# Patient Record
Sex: MALE | Race: WHITE | Employment: OTHER | URBAN - NONMETROPOLITAN AREA
[De-identification: names, ages, dates, MRNs, and addresses within clinical notes are randomized per-mention and may not be internally consistent; named-entity substitution may affect disease eponyms.]

---

## 2018-05-21 ENCOUNTER — HOSPITAL ENCOUNTER (INPATIENT)
Age: 63
LOS: 3 days | Discharge: HOME | DRG: 246 | End: 2018-05-24
Attending: FAMILY MEDICINE | Admitting: INTERNAL MEDICINE
Payer: COMMERCIAL

## 2018-05-21 ENCOUNTER — APPOINTMENT (OUTPATIENT)
Dept: CT IMAGING | Age: 63
DRG: 246 | End: 2018-05-21
Payer: COMMERCIAL

## 2018-05-21 DIAGNOSIS — R07.89 OTHER CHEST PAIN: ICD-10-CM

## 2018-05-21 DIAGNOSIS — I21.3 ST ELEVATION MYOCARDIAL INFARCTION (STEMI), UNSPECIFIED ARTERY (HCC): Primary | ICD-10-CM

## 2018-05-21 LAB
ANION GAP SERPL CALCULATED.3IONS-SCNC: 11 MEQ/L (ref 8–16)
ANISOCYTOSIS: ABNORMAL
BASOPHILS # BLD: 0.1 %
BASOPHILS ABSOLUTE: 0 THOU/MM3 (ref 0–0.1)
BUN BLDV-MCNC: 14 MG/DL (ref 7–22)
CALCIUM SERPL-MCNC: 8.8 MG/DL (ref 8.5–10.5)
CHLORIDE BLD-SCNC: 101 MEQ/L (ref 98–111)
CO2: 27 MEQ/L (ref 23–33)
CREAT SERPL-MCNC: 1.2 MG/DL (ref 0.4–1.2)
EOSINOPHIL # BLD: 0.2 %
EOSINOPHILS ABSOLUTE: 0 THOU/MM3 (ref 0–0.4)
GFR SERPL CREATININE-BSD FRML MDRD: 61 ML/MIN/1.73M2
GLUCOSE BLD-MCNC: 125 MG/DL (ref 70–108)
HCT VFR BLD CALC: 45.8 % (ref 42–52)
HEMOGLOBIN: 15.9 GM/DL (ref 14–18)
LV EF: 53 %
LVEF MODALITY: NORMAL
LYMPHOCYTES # BLD: 7.1 %
LYMPHOCYTES ABSOLUTE: 1.2 THOU/MM3 (ref 1–4.8)
MAGNESIUM: 2 MG/DL (ref 1.6–2.4)
MCH RBC QN AUTO: 30.4 PG (ref 27–31)
MCHC RBC AUTO-ENTMCNC: 34.7 GM/DL (ref 33–37)
MCV RBC AUTO: 87.8 FL (ref 80–94)
MONOCYTES # BLD: 4.5 %
MONOCYTES ABSOLUTE: 0.8 THOU/MM3 (ref 0.4–1.3)
MRSA SCREEN RT-PCR: NEGATIVE
NUCLEATED RED BLOOD CELLS: 0 /100 WBC
PDW BLD-RTO: 14.5 % (ref 11.5–14.5)
PLATELET # BLD: 196 THOU/MM3 (ref 130–400)
PMV BLD AUTO: 10.1 FL (ref 7.4–10.4)
POTASSIUM SERPL-SCNC: 5 MEQ/L (ref 3.5–5.2)
RBC # BLD: 5.22 MILL/MM3 (ref 4.7–6.1)
SEG NEUTROPHILS: 88.1 %
SEGMENTED NEUTROPHILS ABSOLUTE COUNT: 14.9 THOU/MM3 (ref 1.8–7.7)
SODIUM BLD-SCNC: 139 MEQ/L (ref 135–145)
TROPONIN T: 4.09 NG/ML
WBC # BLD: 16.9 THOU/MM3 (ref 4.8–10.8)

## 2018-05-21 PROCEDURE — 84484 ASSAY OF TROPONIN QUANT: CPT

## 2018-05-21 PROCEDURE — 6370000000 HC RX 637 (ALT 250 FOR IP): Performed by: INTERNAL MEDICINE

## 2018-05-21 PROCEDURE — 99291 CRITICAL CARE FIRST HOUR: CPT | Performed by: INTERNAL MEDICINE

## 2018-05-21 PROCEDURE — 36415 COLL VENOUS BLD VENIPUNCTURE: CPT

## 2018-05-21 PROCEDURE — 2580000003 HC RX 258: Performed by: INTERNAL MEDICINE

## 2018-05-21 PROCEDURE — C1769 GUIDE WIRE: HCPCS

## 2018-05-21 PROCEDURE — C1757 CATH, THROMBECTOMY/EMBOLECT: HCPCS

## 2018-05-21 PROCEDURE — 99285 EMERGENCY DEPT VISIT HI MDM: CPT

## 2018-05-21 PROCEDURE — 80048 BASIC METABOLIC PNL TOTAL CA: CPT

## 2018-05-21 PROCEDURE — 70450 CT HEAD/BRAIN W/O DYE: CPT

## 2018-05-21 PROCEDURE — 85025 COMPLETE CBC W/AUTO DIFF WBC: CPT

## 2018-05-21 PROCEDURE — 93458 L HRT ARTERY/VENTRICLE ANGIO: CPT | Performed by: INTERNAL MEDICINE

## 2018-05-21 PROCEDURE — 92941 PRQ TRLML REVSC TOT OCCL AMI: CPT | Performed by: INTERNAL MEDICINE

## 2018-05-21 PROCEDURE — 87081 CULTURE SCREEN ONLY: CPT

## 2018-05-21 PROCEDURE — 93005 ELECTROCARDIOGRAM TRACING: CPT | Performed by: INTERNAL MEDICINE

## 2018-05-21 PROCEDURE — C1894 INTRO/SHEATH, NON-LASER: HCPCS

## 2018-05-21 PROCEDURE — B2151ZZ FLUOROSCOPY OF LEFT HEART USING LOW OSMOLAR CONTRAST: ICD-10-PCS | Performed by: INTERNAL MEDICINE

## 2018-05-21 PROCEDURE — 6360000002 HC RX W HCPCS

## 2018-05-21 PROCEDURE — 87641 MR-STAPH DNA AMP PROBE: CPT

## 2018-05-21 PROCEDURE — 6360000002 HC RX W HCPCS: Performed by: FAMILY MEDICINE

## 2018-05-21 PROCEDURE — 2100000000 HC CCU R&B

## 2018-05-21 PROCEDURE — B2111ZZ FLUOROSCOPY OF MULTIPLE CORONARY ARTERIES USING LOW OSMOLAR CONTRAST: ICD-10-PCS | Performed by: INTERNAL MEDICINE

## 2018-05-21 PROCEDURE — 6360000002 HC RX W HCPCS: Performed by: INTERNAL MEDICINE

## 2018-05-21 PROCEDURE — 2780000010 HC IMPLANT OTHER

## 2018-05-21 PROCEDURE — 93306 TTE W/DOPPLER COMPLETE: CPT

## 2018-05-21 PROCEDURE — 027135Z DILATION OF CORONARY ARTERY, TWO ARTERIES WITH TWO DRUG-ELUTING INTRALUMINAL DEVICES, PERCUTANEOUS APPROACH: ICD-10-PCS | Performed by: INTERNAL MEDICINE

## 2018-05-21 PROCEDURE — 83735 ASSAY OF MAGNESIUM: CPT

## 2018-05-21 PROCEDURE — 4A023N7 MEASUREMENT OF CARDIAC SAMPLING AND PRESSURE, LEFT HEART, PERCUTANEOUS APPROACH: ICD-10-PCS | Performed by: INTERNAL MEDICINE

## 2018-05-21 PROCEDURE — 96374 THER/PROPH/DIAG INJ IV PUSH: CPT

## 2018-05-21 PROCEDURE — C1874 STENT, COATED/COV W/DEL SYS: HCPCS

## 2018-05-21 PROCEDURE — C1887 CATHETER, GUIDING: HCPCS

## 2018-05-21 PROCEDURE — 2580000003 HC RX 258

## 2018-05-21 PROCEDURE — 2500000003 HC RX 250 WO HCPCS

## 2018-05-21 PROCEDURE — C1725 CATH, TRANSLUMIN NON-LASER: HCPCS

## 2018-05-21 PROCEDURE — 2720000010 HC SURG SUPPLY STERILE

## 2018-05-21 RX ORDER — SODIUM CHLORIDE 0.9 % (FLUSH) 0.9 %
10 SYRINGE (ML) INJECTION PRN
Status: DISCONTINUED | OUTPATIENT
Start: 2018-05-21 | End: 2018-05-24 | Stop reason: HOSPADM

## 2018-05-21 RX ORDER — ACETAMINOPHEN 325 MG/1
650 TABLET ORAL EVERY 4 HOURS PRN
Status: DISCONTINUED | OUTPATIENT
Start: 2018-05-21 | End: 2018-05-21 | Stop reason: SDUPTHER

## 2018-05-21 RX ORDER — ASPIRIN 81 MG/1
81 TABLET, CHEWABLE ORAL DAILY
Status: DISCONTINUED | OUTPATIENT
Start: 2018-05-22 | End: 2018-05-24 | Stop reason: HOSPADM

## 2018-05-21 RX ORDER — FINASTERIDE 5 MG/1
5 TABLET, FILM COATED ORAL DAILY
Status: ON HOLD | COMMUNITY
End: 2018-05-22 | Stop reason: CLARIF

## 2018-05-21 RX ORDER — ACETAMINOPHEN 325 MG/1
650 TABLET ORAL EVERY 4 HOURS PRN
Status: DISCONTINUED | OUTPATIENT
Start: 2018-05-21 | End: 2018-05-23 | Stop reason: SDUPTHER

## 2018-05-21 RX ORDER — CARVEDILOL 3.12 MG/1
3.12 TABLET ORAL 2 TIMES DAILY WITH MEALS
Status: DISCONTINUED | OUTPATIENT
Start: 2018-05-21 | End: 2018-05-24 | Stop reason: HOSPADM

## 2018-05-21 RX ORDER — LISINOPRIL 2.5 MG/1
2.5 TABLET ORAL DAILY
Status: DISCONTINUED | OUTPATIENT
Start: 2018-05-21 | End: 2018-05-24 | Stop reason: HOSPADM

## 2018-05-21 RX ORDER — ONDANSETRON 2 MG/ML
4 INJECTION INTRAMUSCULAR; INTRAVENOUS EVERY 6 HOURS PRN
Status: DISCONTINUED | OUTPATIENT
Start: 2018-05-21 | End: 2018-05-23 | Stop reason: SDUPTHER

## 2018-05-21 RX ORDER — ATROPINE SULFATE 0.1 MG/ML
0.5 INJECTION INTRAVENOUS ONCE
Status: COMPLETED | OUTPATIENT
Start: 2018-05-21 | End: 2018-05-21

## 2018-05-21 RX ORDER — SODIUM CHLORIDE 9 MG/ML
75 INJECTION, SOLUTION INTRAVENOUS CONTINUOUS
Status: DISCONTINUED | OUTPATIENT
Start: 2018-05-21 | End: 2018-05-22

## 2018-05-21 RX ORDER — SODIUM CHLORIDE 0.9 % (FLUSH) 0.9 %
10 SYRINGE (ML) INJECTION EVERY 12 HOURS SCHEDULED
Status: DISCONTINUED | OUTPATIENT
Start: 2018-05-21 | End: 2018-05-24 | Stop reason: HOSPADM

## 2018-05-21 RX ORDER — TAMSULOSIN HYDROCHLORIDE 0.4 MG/1
0.4 CAPSULE ORAL DAILY
COMMUNITY
End: 2021-04-06

## 2018-05-21 RX ORDER — TESTOSTERONE CYPIONATE 200 MG/ML
200 INJECTION INTRAMUSCULAR SEE ADMIN INSTRUCTIONS
Status: ON HOLD | COMMUNITY
End: 2018-05-24 | Stop reason: HOSPADM

## 2018-05-21 RX ORDER — ATORVASTATIN CALCIUM 80 MG/1
80 TABLET, FILM COATED ORAL NIGHTLY
Status: DISCONTINUED | OUTPATIENT
Start: 2018-05-21 | End: 2018-05-24 | Stop reason: HOSPADM

## 2018-05-21 RX ORDER — VALSARTAN 160 MG/1
160 TABLET ORAL DAILY
COMMUNITY
End: 2018-09-18 | Stop reason: ALTCHOICE

## 2018-05-21 RX ADMIN — TIROFIBAN 0.15 MCG/KG/MIN: 5 INJECTION, SOLUTION INTRAVENOUS at 20:19

## 2018-05-21 RX ADMIN — ACETAMINOPHEN 650 MG: 325 TABLET ORAL at 16:53

## 2018-05-21 RX ADMIN — CARVEDILOL 3.12 MG: 3.12 TABLET, FILM COATED ORAL at 18:24

## 2018-05-21 RX ADMIN — ATROPINE SULFATE 0.5 MG: 0.1 INJECTION, SOLUTION ENDOTRACHEAL; INTRAMUSCULAR; INTRAVENOUS; SUBCUTANEOUS at 13:14

## 2018-05-21 RX ADMIN — Medication 10 ML: at 20:24

## 2018-05-21 RX ADMIN — LISINOPRIL 2.5 MG: 2.5 TABLET ORAL at 18:25

## 2018-05-21 RX ADMIN — ATORVASTATIN CALCIUM 80 MG: 80 TABLET, FILM COATED ORAL at 20:24

## 2018-05-21 RX ADMIN — TICAGRELOR 90 MG: 90 TABLET ORAL at 20:24

## 2018-05-21 ASSESSMENT — ENCOUNTER SYMPTOMS
VOMITING: 0
EYE PAIN: 0
WHEEZING: 0
EYE REDNESS: 0
EYE DISCHARGE: 0
ABDOMINAL DISTENTION: 0
SHORTNESS OF BREATH: 0
CONSTIPATION: 0
RHINORRHEA: 0
PHOTOPHOBIA: 0
SINUS PRESSURE: 0
TROUBLE SWALLOWING: 0
EYE ITCHING: 0
BACK PAIN: 0
COUGH: 0
VOICE CHANGE: 0
CHOKING: 0
CHEST TIGHTNESS: 0
SORE THROAT: 0
DIARRHEA: 0
BLOOD IN STOOL: 0
NAUSEA: 0
ABDOMINAL PAIN: 0

## 2018-05-21 ASSESSMENT — PAIN SCALES - GENERAL
PAINLEVEL_OUTOF10: 4
PAINLEVEL_OUTOF10: 0

## 2018-05-22 ENCOUNTER — APPOINTMENT (OUTPATIENT)
Dept: MRI IMAGING | Age: 63
DRG: 246 | End: 2018-05-22
Payer: COMMERCIAL

## 2018-05-22 ENCOUNTER — APPOINTMENT (OUTPATIENT)
Dept: CT IMAGING | Age: 63
DRG: 246 | End: 2018-05-22
Payer: COMMERCIAL

## 2018-05-22 PROBLEM — E78.5 HYPERLIPIDEMIA: Status: ACTIVE | Noted: 2018-05-22

## 2018-05-22 PROBLEM — I63.9 LEFT PONTINE STROKE (HCC): Status: ACTIVE | Noted: 2018-05-22

## 2018-05-22 PROBLEM — H53.2 DIPLOPIA: Status: ACTIVE | Noted: 2018-05-22

## 2018-05-22 PROBLEM — I10 HYPERTENSION: Status: ACTIVE | Noted: 2018-05-22

## 2018-05-22 LAB
ABO: NORMAL
ALBUMIN SERPL-MCNC: 3.8 G/DL (ref 3.5–5.1)
ALP BLD-CCNC: 39 U/L (ref 38–126)
ALT SERPL-CCNC: 45 U/L (ref 11–66)
ANION GAP SERPL CALCULATED.3IONS-SCNC: 10 MEQ/L (ref 8–16)
ANION GAP SERPL CALCULATED.3IONS-SCNC: 11 MEQ/L (ref 8–16)
ANTIBODY SCREEN: NORMAL
AST SERPL-CCNC: 150 U/L (ref 5–40)
BILIRUB SERPL-MCNC: 0.4 MG/DL (ref 0.3–1.2)
BUN BLDV-MCNC: 13 MG/DL (ref 7–22)
BUN BLDV-MCNC: 14 MG/DL (ref 7–22)
CALCIUM SERPL-MCNC: 8.4 MG/DL (ref 8.5–10.5)
CALCIUM SERPL-MCNC: 8.6 MG/DL (ref 8.5–10.5)
CHLORIDE BLD-SCNC: 103 MEQ/L (ref 98–111)
CHLORIDE BLD-SCNC: 104 MEQ/L (ref 98–111)
CHOLESTEROL, TOTAL: 173 MG/DL (ref 100–199)
CO2: 23 MEQ/L (ref 23–33)
CO2: 25 MEQ/L (ref 23–33)
CREAT SERPL-MCNC: 0.9 MG/DL (ref 0.4–1.2)
CREAT SERPL-MCNC: 1 MG/DL (ref 0.4–1.2)
EKG ATRIAL RATE: 68 BPM
EKG ATRIAL RATE: 81 BPM
EKG ATRIAL RATE: 82 BPM
EKG P AXIS: 108 DEGREES
EKG P AXIS: 68 DEGREES
EKG P AXIS: 70 DEGREES
EKG P-R INTERVAL: 188 MS
EKG P-R INTERVAL: 188 MS
EKG P-R INTERVAL: 198 MS
EKG Q-T INTERVAL: 376 MS
EKG Q-T INTERVAL: 392 MS
EKG Q-T INTERVAL: 436 MS
EKG QRS DURATION: 106 MS
EKG QRS DURATION: 142 MS
EKG QRS DURATION: 96 MS
EKG QTC CALCULATION (BAZETT): 416 MS
EKG QTC CALCULATION (BAZETT): 439 MS
EKG QTC CALCULATION (BAZETT): 506 MS
EKG R AXIS: -173 DEGREES
EKG R AXIS: -94 DEGREES
EKG T AXIS: 144 DEGREES
EKG T AXIS: 36 DEGREES
EKG T AXIS: 72 DEGREES
EKG VENTRICULAR RATE: 68 BPM
EKG VENTRICULAR RATE: 81 BPM
EKG VENTRICULAR RATE: 82 BPM
GFR SERPL CREATININE-BSD FRML MDRD: 76 ML/MIN/1.73M2
GFR SERPL CREATININE-BSD FRML MDRD: 85 ML/MIN/1.73M2
GLUCOSE BLD-MCNC: 117 MG/DL (ref 70–108)
GLUCOSE BLD-MCNC: 128 MG/DL (ref 70–108)
HCT VFR BLD CALC: 40.9 % (ref 42–52)
HCT VFR BLD CALC: 41 % (ref 42–52)
HDLC SERPL-MCNC: 41 MG/DL
HEMOGLOBIN: 13.6 GM/DL (ref 14–18)
HEMOGLOBIN: 13.7 GM/DL (ref 14–18)
INR BLD: 1.01 (ref 0.85–1.13)
LDL CHOLESTEROL CALCULATED: 112 MG/DL
MAGNESIUM: 2 MG/DL (ref 1.6–2.4)
MCH RBC QN AUTO: 28.9 PG (ref 27–31)
MCH RBC QN AUTO: 29.4 PG (ref 27–31)
MCHC RBC AUTO-ENTMCNC: 33.3 GM/DL (ref 33–37)
MCHC RBC AUTO-ENTMCNC: 33.4 GM/DL (ref 33–37)
MCV RBC AUTO: 87 FL (ref 80–94)
MCV RBC AUTO: 87.8 FL (ref 80–94)
PDW BLD-RTO: 13.9 % (ref 11.5–14.5)
PDW BLD-RTO: 14.3 % (ref 11.5–14.5)
PLATELET # BLD: 148 THOU/MM3 (ref 130–400)
PLATELET # BLD: 164 THOU/MM3 (ref 130–400)
PMV BLD AUTO: 10 FL (ref 7.4–10.4)
PMV BLD AUTO: 10 FL (ref 7.4–10.4)
POTASSIUM REFLEX MAGNESIUM: 4.6 MEQ/L (ref 3.5–5.2)
POTASSIUM SERPL-SCNC: 4 MEQ/L (ref 3.5–5.2)
RBC # BLD: 4.66 MILL/MM3 (ref 4.7–6.1)
RBC # BLD: 4.72 MILL/MM3 (ref 4.7–6.1)
RH FACTOR: NORMAL
SODIUM BLD-SCNC: 137 MEQ/L (ref 135–145)
SODIUM BLD-SCNC: 139 MEQ/L (ref 135–145)
TOTAL PROTEIN: 6.4 G/DL (ref 6.1–8)
TRIGL SERPL-MCNC: 99 MG/DL (ref 0–199)
TROPONIN T: 4.64 NG/ML
WBC # BLD: 11.3 THOU/MM3 (ref 4.8–10.8)
WBC # BLD: 9.4 THOU/MM3 (ref 4.8–10.8)

## 2018-05-22 PROCEDURE — 84484 ASSAY OF TROPONIN QUANT: CPT

## 2018-05-22 PROCEDURE — 86901 BLOOD TYPING SEROLOGIC RH(D): CPT

## 2018-05-22 PROCEDURE — 85027 COMPLETE CBC AUTOMATED: CPT

## 2018-05-22 PROCEDURE — 93010 ELECTROCARDIOGRAM REPORT: CPT | Performed by: INTERNAL MEDICINE

## 2018-05-22 PROCEDURE — 80053 COMPREHEN METABOLIC PANEL: CPT

## 2018-05-22 PROCEDURE — 36415 COLL VENOUS BLD VENIPUNCTURE: CPT

## 2018-05-22 PROCEDURE — 2580000003 HC RX 258: Performed by: PSYCHIATRY & NEUROLOGY

## 2018-05-22 PROCEDURE — 80048 BASIC METABOLIC PNL TOTAL CA: CPT

## 2018-05-22 PROCEDURE — 6370000000 HC RX 637 (ALT 250 FOR IP): Performed by: PHYSICIAN ASSISTANT

## 2018-05-22 PROCEDURE — 70498 CT ANGIOGRAPHY NECK: CPT

## 2018-05-22 PROCEDURE — 80061 LIPID PANEL: CPT

## 2018-05-22 PROCEDURE — 93005 ELECTROCARDIOGRAM TRACING: CPT | Performed by: INTERNAL MEDICINE

## 2018-05-22 PROCEDURE — 99232 SBSQ HOSP IP/OBS MODERATE 35: CPT | Performed by: NURSE PRACTITIONER

## 2018-05-22 PROCEDURE — 85610 PROTHROMBIN TIME: CPT

## 2018-05-22 PROCEDURE — 70551 MRI BRAIN STEM W/O DYE: CPT

## 2018-05-22 PROCEDURE — 6360000004 HC RX CONTRAST MEDICATION: Performed by: PSYCHIATRY & NEUROLOGY

## 2018-05-22 PROCEDURE — 86850 RBC ANTIBODY SCREEN: CPT

## 2018-05-22 PROCEDURE — 2100000000 HC CCU R&B

## 2018-05-22 PROCEDURE — 6370000000 HC RX 637 (ALT 250 FOR IP): Performed by: INTERNAL MEDICINE

## 2018-05-22 PROCEDURE — 86900 BLOOD TYPING SEROLOGIC ABO: CPT

## 2018-05-22 PROCEDURE — 83735 ASSAY OF MAGNESIUM: CPT

## 2018-05-22 PROCEDURE — 2580000003 HC RX 258: Performed by: INTERNAL MEDICINE

## 2018-05-22 PROCEDURE — 99291 CRITICAL CARE FIRST HOUR: CPT | Performed by: PSYCHIATRY & NEUROLOGY

## 2018-05-22 PROCEDURE — 6360000002 HC RX W HCPCS: Performed by: PSYCHIATRY & NEUROLOGY

## 2018-05-22 PROCEDURE — 99223 1ST HOSP IP/OBS HIGH 75: CPT | Performed by: PSYCHIATRY & NEUROLOGY

## 2018-05-22 PROCEDURE — 70496 CT ANGIOGRAPHY HEAD: CPT

## 2018-05-22 PROCEDURE — 2700000000 HC OXYGEN THERAPY PER DAY

## 2018-05-22 RX ORDER — PANTOPRAZOLE SODIUM 40 MG/1
40 TABLET, DELAYED RELEASE ORAL ONCE
Status: COMPLETED | OUTPATIENT
Start: 2018-05-22 | End: 2018-05-22

## 2018-05-22 RX ORDER — OXYBUTYNIN CHLORIDE 5 MG/1
5 TABLET, EXTENDED RELEASE ORAL DAILY
COMMUNITY
End: 2018-09-13

## 2018-05-22 RX ORDER — TAMSULOSIN HYDROCHLORIDE 0.4 MG/1
0.4 CAPSULE ORAL DAILY
Status: DISCONTINUED | OUTPATIENT
Start: 2018-05-22 | End: 2018-05-24 | Stop reason: HOSPADM

## 2018-05-22 RX ORDER — SODIUM CHLORIDE 9 MG/ML
INJECTION, SOLUTION INTRAVENOUS CONTINUOUS
Status: DISCONTINUED | OUTPATIENT
Start: 2018-05-22 | End: 2018-05-23 | Stop reason: SDUPTHER

## 2018-05-22 RX ORDER — HEPARIN SODIUM 10000 [USP'U]/100ML
10 INJECTION, SOLUTION INTRAVENOUS CONTINUOUS
Status: DISCONTINUED | OUTPATIENT
Start: 2018-05-22 | End: 2018-05-24

## 2018-05-22 RX ORDER — SODIUM CHLORIDE 0.9 % (FLUSH) 0.9 %
10 SYRINGE (ML) INJECTION PRN
Status: DISCONTINUED | OUTPATIENT
Start: 2018-05-22 | End: 2018-05-24 | Stop reason: HOSPADM

## 2018-05-22 RX ORDER — CLOPIDOGREL BISULFATE 75 MG/1
75 TABLET ORAL DAILY
Status: DISCONTINUED | OUTPATIENT
Start: 2018-05-22 | End: 2018-05-24 | Stop reason: HOSPADM

## 2018-05-22 RX ORDER — OXYBUTYNIN CHLORIDE 5 MG/1
5 TABLET, EXTENDED RELEASE ORAL NIGHTLY
Status: DISCONTINUED | OUTPATIENT
Start: 2018-05-22 | End: 2018-05-24 | Stop reason: HOSPADM

## 2018-05-22 RX ADMIN — Medication 10 ML: at 22:58

## 2018-05-22 RX ADMIN — ACETAMINOPHEN 650 MG: 325 TABLET ORAL at 16:33

## 2018-05-22 RX ADMIN — MAGNESIUM HYDROXIDE 30 ML: 400 SUSPENSION ORAL at 22:45

## 2018-05-22 RX ADMIN — SODIUM CHLORIDE: 9 INJECTION, SOLUTION INTRAVENOUS at 22:55

## 2018-05-22 RX ADMIN — LISINOPRIL 2.5 MG: 2.5 TABLET ORAL at 08:05

## 2018-05-22 RX ADMIN — CARVEDILOL 3.12 MG: 3.12 TABLET, FILM COATED ORAL at 16:31

## 2018-05-22 RX ADMIN — TICAGRELOR 90 MG: 90 TABLET ORAL at 08:05

## 2018-05-22 RX ADMIN — IOPAMIDOL 80 ML: 755 INJECTION, SOLUTION INTRAVENOUS at 12:48

## 2018-05-22 RX ADMIN — PANTOPRAZOLE SODIUM 40 MG: 40 TABLET, DELAYED RELEASE ORAL at 13:28

## 2018-05-22 RX ADMIN — ATORVASTATIN CALCIUM 80 MG: 80 TABLET, FILM COATED ORAL at 22:59

## 2018-05-22 RX ADMIN — MAGNESIUM HYDROXIDE 30 ML: 400 SUSPENSION ORAL at 08:05

## 2018-05-22 RX ADMIN — ASPIRIN 81 MG 81 MG: 81 TABLET ORAL at 08:05

## 2018-05-22 RX ADMIN — Medication 10 ML: at 08:06

## 2018-05-22 RX ADMIN — CLOPIDOGREL BISULFATE 75 MG: 75 TABLET ORAL at 22:58

## 2018-05-22 RX ADMIN — OXYBUTYNIN CHLORIDE 5 MG: 5 TABLET, FILM COATED, EXTENDED RELEASE ORAL at 22:58

## 2018-05-22 RX ADMIN — CARVEDILOL 3.12 MG: 3.12 TABLET, FILM COATED ORAL at 08:05

## 2018-05-22 RX ADMIN — TAMSULOSIN HYDROCHLORIDE 0.4 MG: 0.4 CAPSULE ORAL at 22:58

## 2018-05-22 RX ADMIN — HEPARIN SODIUM 6.8 UNITS/KG/HR: 10000 INJECTION, SOLUTION INTRAVENOUS at 22:55

## 2018-05-22 ASSESSMENT — PAIN SCALES - GENERAL
PAINLEVEL_OUTOF10: 4
PAINLEVEL_OUTOF10: 4
PAINLEVEL_OUTOF10: 0

## 2018-05-22 ASSESSMENT — PAIN DESCRIPTION - LOCATION: LOCATION: HEAD

## 2018-05-22 ASSESSMENT — PAIN DESCRIPTION - DESCRIPTORS: DESCRIPTORS: ACHING

## 2018-05-22 ASSESSMENT — PAIN DESCRIPTION - PAIN TYPE: TYPE: ACUTE PAIN

## 2018-05-23 ENCOUNTER — APPOINTMENT (OUTPATIENT)
Dept: CARDIAC CATH/INVASIVE PROCEDURES | Age: 63
DRG: 246 | End: 2018-05-23
Payer: COMMERCIAL

## 2018-05-23 LAB
APTT: 44.6 SECONDS (ref 22–38)
APTT: 52.1 SECONDS (ref 22–38)
MRSA SCREEN: NORMAL
TROPONIN T: 1.91 NG/ML
VRE CULTURE: NORMAL

## 2018-05-23 PROCEDURE — 2500000003 HC RX 250 WO HCPCS

## 2018-05-23 PROCEDURE — 85730 THROMBOPLASTIN TIME PARTIAL: CPT

## 2018-05-23 PROCEDURE — C1884 EMBOLIZATION PROTECT SYST: HCPCS

## 2018-05-23 PROCEDURE — C1876 STENT, NON-COA/NON-COV W/DEL: HCPCS

## 2018-05-23 PROCEDURE — 6370000000 HC RX 637 (ALT 250 FOR IP): Performed by: PHYSICIAN ASSISTANT

## 2018-05-23 PROCEDURE — 2000000000 HC ICU R&B

## 2018-05-23 PROCEDURE — 6370000000 HC RX 637 (ALT 250 FOR IP): Performed by: INTERNAL MEDICINE

## 2018-05-23 PROCEDURE — 2580000003 HC RX 258: Performed by: PSYCHIATRY & NEUROLOGY

## 2018-05-23 PROCEDURE — C1769 GUIDE WIRE: HCPCS

## 2018-05-23 PROCEDURE — C1725 CATH, TRANSLUMIN NON-LASER: HCPCS

## 2018-05-23 PROCEDURE — C1887 CATHETER, GUIDING: HCPCS

## 2018-05-23 PROCEDURE — C1894 INTRO/SHEATH, NON-LASER: HCPCS

## 2018-05-23 PROCEDURE — 037K34Z DILATION OF RIGHT INTERNAL CAROTID ARTERY WITH DRUG-ELUTING INTRALUMINAL DEVICE, PERCUTANEOUS APPROACH: ICD-10-PCS | Performed by: PSYCHIATRY & NEUROLOGY

## 2018-05-23 PROCEDURE — 6360000002 HC RX W HCPCS

## 2018-05-23 PROCEDURE — 99232 SBSQ HOSP IP/OBS MODERATE 35: CPT | Performed by: NURSE PRACTITIONER

## 2018-05-23 PROCEDURE — 36415 COLL VENOUS BLD VENIPUNCTURE: CPT

## 2018-05-23 PROCEDURE — 37215 TRANSCATH STENT CCA W/EPS: CPT

## 2018-05-23 PROCEDURE — 84484 ASSAY OF TROPONIN QUANT: CPT

## 2018-05-23 RX ORDER — ONDANSETRON 2 MG/ML
4 INJECTION INTRAMUSCULAR; INTRAVENOUS EVERY 8 HOURS PRN
Status: DISCONTINUED | OUTPATIENT
Start: 2018-05-23 | End: 2018-05-24 | Stop reason: HOSPADM

## 2018-05-23 RX ORDER — ACETAMINOPHEN 325 MG/1
650 TABLET ORAL EVERY 4 HOURS PRN
Status: DISCONTINUED | OUTPATIENT
Start: 2018-05-23 | End: 2018-05-24 | Stop reason: HOSPADM

## 2018-05-23 RX ORDER — SODIUM CHLORIDE 9 MG/ML
INJECTION, SOLUTION INTRAVENOUS CONTINUOUS
Status: DISCONTINUED | OUTPATIENT
Start: 2018-05-23 | End: 2018-05-24

## 2018-05-23 RX ORDER — HYDROCODONE BITARTRATE AND ACETAMINOPHEN 5; 325 MG/1; MG/1
1 TABLET ORAL EVERY 4 HOURS PRN
Status: DISCONTINUED | OUTPATIENT
Start: 2018-05-23 | End: 2018-05-24 | Stop reason: HOSPADM

## 2018-05-23 RX ADMIN — CARVEDILOL 3.12 MG: 3.12 TABLET, FILM COATED ORAL at 22:12

## 2018-05-23 RX ADMIN — LISINOPRIL 2.5 MG: 2.5 TABLET ORAL at 09:11

## 2018-05-23 RX ADMIN — TAMSULOSIN HYDROCHLORIDE 0.4 MG: 0.4 CAPSULE ORAL at 09:11

## 2018-05-23 RX ADMIN — CARVEDILOL 3.12 MG: 3.12 TABLET, FILM COATED ORAL at 08:21

## 2018-05-23 RX ADMIN — OXYBUTYNIN CHLORIDE 5 MG: 5 TABLET, FILM COATED, EXTENDED RELEASE ORAL at 22:12

## 2018-05-23 RX ADMIN — ASPIRIN 81 MG 81 MG: 81 TABLET ORAL at 08:21

## 2018-05-23 RX ADMIN — SODIUM CHLORIDE: 9 INJECTION, SOLUTION INTRAVENOUS at 22:38

## 2018-05-23 RX ADMIN — SODIUM CHLORIDE: 9 INJECTION, SOLUTION INTRAVENOUS at 14:19

## 2018-05-23 RX ADMIN — ATORVASTATIN CALCIUM 80 MG: 80 TABLET, FILM COATED ORAL at 22:12

## 2018-05-23 RX ADMIN — SODIUM CHLORIDE: 9 INJECTION, SOLUTION INTRAVENOUS at 07:23

## 2018-05-23 RX ADMIN — CLOPIDOGREL BISULFATE 75 MG: 75 TABLET ORAL at 08:21

## 2018-05-23 ASSESSMENT — PAIN SCALES - GENERAL
PAINLEVEL_OUTOF10: 0

## 2018-05-24 VITALS
BODY MASS INDEX: 50.62 KG/M2 | OXYGEN SATURATION: 97 % | SYSTOLIC BLOOD PRESSURE: 133 MMHG | WEIGHT: 315 LBS | DIASTOLIC BLOOD PRESSURE: 59 MMHG | RESPIRATION RATE: 19 BRPM | HEIGHT: 66 IN | TEMPERATURE: 97.6 F | HEART RATE: 53 BPM

## 2018-05-24 PROCEDURE — 6360000002 HC RX W HCPCS: Performed by: INTERNAL MEDICINE

## 2018-05-24 PROCEDURE — 6370000000 HC RX 637 (ALT 250 FOR IP): Performed by: PHYSICIAN ASSISTANT

## 2018-05-24 PROCEDURE — 99239 HOSP IP/OBS DSCHRG MGMT >30: CPT | Performed by: PHYSICIAN ASSISTANT

## 2018-05-24 PROCEDURE — 6370000000 HC RX 637 (ALT 250 FOR IP): Performed by: INTERNAL MEDICINE

## 2018-05-24 RX ORDER — ASPIRIN 81 MG/1
81 TABLET, CHEWABLE ORAL DAILY
Qty: 30 TABLET | Refills: 3 | Status: SHIPPED | OUTPATIENT
Start: 2018-05-25

## 2018-05-24 RX ORDER — ATORVASTATIN CALCIUM 80 MG/1
80 TABLET, FILM COATED ORAL NIGHTLY
Qty: 30 TABLET | Refills: 3 | Status: SHIPPED | OUTPATIENT
Start: 2018-05-24 | End: 2018-06-28

## 2018-05-24 RX ORDER — CLOPIDOGREL BISULFATE 75 MG/1
75 TABLET ORAL DAILY
Qty: 30 TABLET | Refills: 3 | Status: SHIPPED | OUTPATIENT
Start: 2018-05-25 | End: 2018-09-18 | Stop reason: SDUPTHER

## 2018-05-24 RX ORDER — NITROGLYCERIN 0.4 MG/1
0.4 TABLET SUBLINGUAL EVERY 5 MIN PRN
Qty: 25 TABLET | Refills: 3 | Status: SHIPPED | OUTPATIENT
Start: 2018-05-24 | End: 2020-09-28 | Stop reason: SDUPTHER

## 2018-05-24 RX ORDER — CARVEDILOL 3.12 MG/1
3.12 TABLET ORAL 2 TIMES DAILY WITH MEALS
Qty: 60 TABLET | Refills: 3 | Status: SHIPPED | OUTPATIENT
Start: 2018-05-24 | End: 2018-06-13 | Stop reason: ALTCHOICE

## 2018-05-24 RX ADMIN — TAMSULOSIN HYDROCHLORIDE 0.4 MG: 0.4 CAPSULE ORAL at 08:08

## 2018-05-24 RX ADMIN — LISINOPRIL 2.5 MG: 2.5 TABLET ORAL at 08:08

## 2018-05-24 RX ADMIN — ENOXAPARIN SODIUM 40 MG: 40 INJECTION SUBCUTANEOUS at 12:47

## 2018-05-24 RX ADMIN — CLOPIDOGREL BISULFATE 75 MG: 75 TABLET ORAL at 08:10

## 2018-05-24 RX ADMIN — ASPIRIN 81 MG 81 MG: 81 TABLET ORAL at 08:10

## 2018-05-24 ASSESSMENT — PAIN SCALES - GENERAL
PAINLEVEL_OUTOF10: 0
PAINLEVEL_OUTOF10: 0

## 2018-05-29 ENCOUNTER — TELEPHONE (OUTPATIENT)
Dept: CARDIOLOGY CLINIC | Age: 63
End: 2018-05-29

## 2018-05-31 ENCOUNTER — TELEPHONE (OUTPATIENT)
Dept: CARDIOLOGY CLINIC | Age: 63
End: 2018-05-31

## 2018-06-13 ENCOUNTER — OFFICE VISIT (OUTPATIENT)
Dept: CARDIOLOGY CLINIC | Age: 63
End: 2018-06-13
Payer: COMMERCIAL

## 2018-06-13 VITALS
HEART RATE: 89 BPM | DIASTOLIC BLOOD PRESSURE: 77 MMHG | WEIGHT: 300 LBS | HEIGHT: 66 IN | SYSTOLIC BLOOD PRESSURE: 133 MMHG | BODY MASS INDEX: 48.21 KG/M2

## 2018-06-13 DIAGNOSIS — Z95.820 S/P ANGIOPLASTY WITH STENT: ICD-10-CM

## 2018-06-13 DIAGNOSIS — E78.00 PURE HYPERCHOLESTEROLEMIA: ICD-10-CM

## 2018-06-13 DIAGNOSIS — I21.11 ST ELEVATION MYOCARDIAL INFARCTION INVOLVING RIGHT CORONARY ARTERY (HCC): ICD-10-CM

## 2018-06-13 DIAGNOSIS — I10 ESSENTIAL HYPERTENSION: ICD-10-CM

## 2018-06-13 DIAGNOSIS — I25.10 CORONARY ARTERY DISEASE INVOLVING NATIVE CORONARY ARTERY OF NATIVE HEART WITHOUT ANGINA PECTORIS: Primary | ICD-10-CM

## 2018-06-13 PROCEDURE — 99213 OFFICE O/P EST LOW 20 MIN: CPT | Performed by: PHYSICIAN ASSISTANT

## 2018-06-28 ENCOUNTER — OFFICE VISIT (OUTPATIENT)
Dept: NEUROLOGY | Age: 63
End: 2018-06-28

## 2018-06-28 VITALS
HEART RATE: 68 BPM | DIASTOLIC BLOOD PRESSURE: 68 MMHG | WEIGHT: 300.4 LBS | SYSTOLIC BLOOD PRESSURE: 118 MMHG | BODY MASS INDEX: 48.28 KG/M2 | HEIGHT: 66 IN

## 2018-06-28 DIAGNOSIS — I99.8 ISCHEMIA: Primary | ICD-10-CM

## 2018-06-28 PROCEDURE — 99024 POSTOP FOLLOW-UP VISIT: CPT | Performed by: PSYCHIATRY & NEUROLOGY

## 2018-07-20 RX ORDER — VITAMIN B COMPLEX
TABLET ORAL DAILY
COMMUNITY
End: 2018-09-18 | Stop reason: ALTCHOICE

## 2018-07-20 NOTE — TELEPHONE ENCOUNTER
Pt wife LM on nurse line , pt was told at last appt to call and let office know how he was feeling after stopping Lipitor, is feeling fine. Asking if Estrella Luevano would want pt to try something new in place of lipitor. Jero advise?

## 2018-07-20 NOTE — TELEPHONE ENCOUNTER
Spoke to Yulia Feldman pt wife per jose, voiced understanding. Rx pended for Sin Neri. Med list updated.

## 2018-07-21 RX ORDER — PRAVASTATIN SODIUM 40 MG
40 TABLET ORAL NIGHTLY
Qty: 30 TABLET | Refills: 3 | Status: SHIPPED | OUTPATIENT
Start: 2018-07-21 | End: 2018-08-09

## 2018-08-09 ENCOUNTER — OFFICE VISIT (OUTPATIENT)
Dept: NEUROLOGY | Age: 63
End: 2018-08-09

## 2018-08-09 VITALS
SYSTOLIC BLOOD PRESSURE: 120 MMHG | DIASTOLIC BLOOD PRESSURE: 78 MMHG | BODY MASS INDEX: 49.18 KG/M2 | HEIGHT: 66 IN | HEART RATE: 80 BPM | WEIGHT: 306 LBS

## 2018-08-09 DIAGNOSIS — I99.8 ISCHEMIA: Primary | ICD-10-CM

## 2018-08-09 PROCEDURE — 99024 POSTOP FOLLOW-UP VISIT: CPT | Performed by: PSYCHIATRY & NEUROLOGY

## 2018-09-13 ENCOUNTER — OFFICE VISIT (OUTPATIENT)
Dept: NEUROLOGY | Age: 63
End: 2018-09-13
Payer: COMMERCIAL

## 2018-09-13 VITALS
SYSTOLIC BLOOD PRESSURE: 132 MMHG | HEART RATE: 78 BPM | WEIGHT: 309 LBS | HEIGHT: 66 IN | BODY MASS INDEX: 49.66 KG/M2 | DIASTOLIC BLOOD PRESSURE: 82 MMHG

## 2018-09-13 DIAGNOSIS — I99.8 ISCHEMIA: Primary | ICD-10-CM

## 2018-09-13 PROCEDURE — 99212 OFFICE O/P EST SF 10 MIN: CPT | Performed by: PSYCHIATRY & NEUROLOGY

## 2018-09-16 NOTE — PROGRESS NOTES
[address] [facility]  [address] [facility] [address].  Dept: [phone]  Dept Fax: [phone]: [phone]          Dear Dr. Eamon Weber, I had the pleasure of seeing Maribel Calderon in clinic on 9/13/2018. Below is my most recent note:       ASSESSMENT AND PLAN:  Recent stroke s/p stenting. He was having attention issues on last visit that are improving. At this point, no changes to medications/regimen. Carotid duplex in 6 months. This can be ordered by PCP. Patient given educational materials - see patient instructions. Discussed use, benefit, and side effects of prescribed medications. Personally reviewed imaging with patients and all questions answered. Pt voiced understanding. Patient agreed with treatment plan. 10 minutes of face-to-face time was spent on the care of this patient, greater than half of this time involved counseling and coordination of care regarding diagnosis, risk and benefits of various treatment plans and expected outcomes. Please feel free to call with any questions. Yolanda Estrada M.D., J.D. Vascular Neurology and Endovascular Surgical Neuroradiology  Cell Number: [id number]        Subjective/History of Presenting Illness:  Maribel Calderon is a 59 y.o. male being seen for follow-up regarding prior stroke and stenting and attention issues since stroke. Since being seen last, Maribel Calderon has done better. He notes his attention is improving. 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 Review of Systems:  ROS was reviewed today and unchanged since last review. Namely, there are no new rashes, no new symptoms concerning for anaphylaxis, no new black/tarry stools, no new symptoms of temperature intolerance. Medications:  No current facility-administered medications for this visit.      Objective:  Vitals:    09/13/18 1027   BP: 132/82   Pulse: 78

## 2018-09-18 ENCOUNTER — OFFICE VISIT (OUTPATIENT)
Dept: CARDIOLOGY CLINIC | Age: 63
End: 2018-09-18
Payer: COMMERCIAL

## 2018-09-18 VITALS
HEIGHT: 66 IN | HEART RATE: 100 BPM | SYSTOLIC BLOOD PRESSURE: 142 MMHG | WEIGHT: 308.8 LBS | BODY MASS INDEX: 49.63 KG/M2 | DIASTOLIC BLOOD PRESSURE: 84 MMHG

## 2018-09-18 DIAGNOSIS — E78.00 PURE HYPERCHOLESTEROLEMIA: ICD-10-CM

## 2018-09-18 DIAGNOSIS — I10 ESSENTIAL HYPERTENSION: Primary | ICD-10-CM

## 2018-09-18 DIAGNOSIS — I25.10 CORONARY ARTERY DISEASE INVOLVING NATIVE CORONARY ARTERY OF NATIVE HEART WITHOUT ANGINA PECTORIS: ICD-10-CM

## 2018-09-18 PROCEDURE — 99214 OFFICE O/P EST MOD 30 MIN: CPT | Performed by: NUCLEAR MEDICINE

## 2018-09-18 RX ORDER — IRBESARTAN 150 MG/1
150 TABLET ORAL DAILY
COMMUNITY
End: 2018-09-18 | Stop reason: SDUPTHER

## 2018-09-18 RX ORDER — CLOPIDOGREL BISULFATE 75 MG/1
75 TABLET ORAL DAILY
Qty: 90 TABLET | Refills: 3 | Status: SHIPPED | OUTPATIENT
Start: 2018-09-18 | End: 2019-09-23 | Stop reason: SDUPTHER

## 2018-09-18 RX ORDER — DIMENHYDRINATE 50 MG
TABLET ORAL
COMMUNITY
End: 2021-06-15

## 2018-09-18 RX ORDER — IRBESARTAN 150 MG/1
150 TABLET ORAL DAILY
Qty: 90 TABLET | Refills: 3 | Status: SHIPPED | OUTPATIENT
Start: 2018-09-18 | End: 2019-09-23 | Stop reason: SDUPTHER

## 2018-09-18 RX ORDER — PRAVASTATIN SODIUM 40 MG
20 TABLET ORAL EVERY OTHER DAY
COMMUNITY
End: 2021-04-06

## 2018-10-03 NOTE — PROGRESS NOTES
Neuro: A+Ox3  CN 2-12 intact   Antigravity x4  Sensory/Reflexes unchanged    Labs and imaging have been personally reviewed.   Lab Results   Component Value Date    WBC 9.4 05/22/2018    HGB 13.7 05/22/2018    HCT 40.9 05/22/2018    MCV 87.8 05/22/2018     05/22/2018     Lab Results   Component Value Date     05/22/2018    K 4.0 05/22/2018    K 4.6 05/22/2018     05/22/2018    CO2 23 05/22/2018    BUN 13 05/22/2018    CREATININE 0.9 05/22/2018    CALCIUM 8.6 05/22/2018     Lab Results   Component Value Date    INR 1.01 05/22/2018     Lab Results   Component Value Date    APTT 52.1 05/23/2018     Lab Results   Component Value Date    ALKPHOS 39 05/22/2018    ALT 45 05/22/2018     05/22/2018    BILITOT 0.4 05/22/2018    LABALBU 3.8 05/22/2018     No results found for: TROPONINI   No results found for: LABA1C    No results found for: CHARCSF, CULTURE  No results found for: PHENYTOIN, CARBTOT, PHENOBARB, VALPROATE  No results found for: TSHHS    No results found for: RONAL Jurado, LABCAST, [facility], [address] [place] [address], MUCUS, TRICHOMONAS, YEAST, BACTERIA, CLARITYU, SPECGRAV, LEUKOCYTESUR, [address], [address] [place], [place] [address] [address] GLUCOSEU, [address], AMORPHOUS

## 2018-10-30 ENCOUNTER — TELEPHONE (OUTPATIENT)
Dept: CARDIOLOGY CLINIC | Age: 63
End: 2018-10-30

## 2018-11-14 RX ORDER — ICOSAPENT ETHYL 1000 MG/1
1 CAPSULE ORAL DAILY
Qty: 30 CAPSULE | Refills: 11 | Status: SHIPPED | OUTPATIENT
Start: 2018-11-14 | End: 2021-04-06

## 2018-11-14 RX ORDER — ICOSAPENT ETHYL 1000 MG/1
1 CAPSULE ORAL DAILY
COMMUNITY
End: 2018-11-14 | Stop reason: SDUPTHER

## 2018-11-14 NOTE — TELEPHONE ENCOUNTER
Patient states he is now doing Pravastatin 20 mg every other day. Joint pain seem's to be better. Patient asking Vascepa fish oil is an option for his cholesterol? He heard about Vascepa on 60 minutes.

## 2019-03-05 ENCOUNTER — OFFICE VISIT (OUTPATIENT)
Dept: CARDIOLOGY CLINIC | Age: 64
End: 2019-03-05
Payer: COMMERCIAL

## 2019-03-05 VITALS
HEART RATE: 88 BPM | DIASTOLIC BLOOD PRESSURE: 96 MMHG | WEIGHT: 315 LBS | BODY MASS INDEX: 53.48 KG/M2 | SYSTOLIC BLOOD PRESSURE: 160 MMHG

## 2019-03-05 DIAGNOSIS — E78.01 FAMILIAL HYPERCHOLESTEROLEMIA: ICD-10-CM

## 2019-03-05 DIAGNOSIS — R09.89 BRUIT: ICD-10-CM

## 2019-03-05 DIAGNOSIS — I10 ESSENTIAL HYPERTENSION: ICD-10-CM

## 2019-03-05 DIAGNOSIS — I25.10 CORONARY ARTERY DISEASE INVOLVING NATIVE CORONARY ARTERY OF NATIVE HEART WITHOUT ANGINA PECTORIS: Primary | ICD-10-CM

## 2019-03-05 PROCEDURE — 99214 OFFICE O/P EST MOD 30 MIN: CPT | Performed by: NUCLEAR MEDICINE

## 2019-03-12 DIAGNOSIS — I10 ESSENTIAL HYPERTENSION: ICD-10-CM

## 2019-03-12 DIAGNOSIS — E78.01 FAMILIAL HYPERCHOLESTEROLEMIA: ICD-10-CM

## 2019-03-12 DIAGNOSIS — I25.10 CORONARY ARTERY DISEASE INVOLVING NATIVE CORONARY ARTERY OF NATIVE HEART WITHOUT ANGINA PECTORIS: ICD-10-CM

## 2019-03-12 DIAGNOSIS — R09.89 BRUIT: ICD-10-CM

## 2019-04-11 ENCOUNTER — TELEPHONE (OUTPATIENT)
Dept: CARDIOLOGY CLINIC | Age: 64
End: 2019-04-11

## 2019-09-23 RX ORDER — IRBESARTAN 150 MG/1
150 TABLET ORAL DAILY
Qty: 90 TABLET | Refills: 3 | Status: SHIPPED | OUTPATIENT
Start: 2019-09-23 | End: 2020-07-14 | Stop reason: DRUGHIGH

## 2019-09-23 RX ORDER — CLOPIDOGREL BISULFATE 75 MG/1
75 TABLET ORAL DAILY
Qty: 90 TABLET | Refills: 3 | Status: SHIPPED | OUTPATIENT
Start: 2019-09-23 | End: 2020-09-28 | Stop reason: SDUPTHER

## 2019-11-05 ENCOUNTER — OFFICE VISIT (OUTPATIENT)
Dept: CARDIOLOGY CLINIC | Age: 64
End: 2019-11-05
Payer: COMMERCIAL

## 2019-11-05 VITALS
HEART RATE: 100 BPM | WEIGHT: 315 LBS | DIASTOLIC BLOOD PRESSURE: 98 MMHG | SYSTOLIC BLOOD PRESSURE: 164 MMHG | BODY MASS INDEX: 50.62 KG/M2 | HEIGHT: 66 IN

## 2019-11-05 DIAGNOSIS — I10 ESSENTIAL HYPERTENSION: ICD-10-CM

## 2019-11-05 DIAGNOSIS — E78.01 FAMILIAL HYPERCHOLESTEROLEMIA: ICD-10-CM

## 2019-11-05 DIAGNOSIS — I25.10 CORONARY ARTERY DISEASE INVOLVING NATIVE CORONARY ARTERY OF NATIVE HEART WITHOUT ANGINA PECTORIS: Primary | ICD-10-CM

## 2019-11-05 PROCEDURE — 99213 OFFICE O/P EST LOW 20 MIN: CPT | Performed by: NUCLEAR MEDICINE

## 2020-01-02 ENCOUNTER — TELEPHONE (OUTPATIENT)
Dept: CARDIOLOGY CLINIC | Age: 65
End: 2020-01-02

## 2020-01-02 NOTE — TELEPHONE ENCOUNTER
Pt calling. He states he is still having muscle aches/pain along with \"inactivity\" and would like to hold Repatha for a couple months to see if it goes away. Please advise.

## 2020-01-31 ENCOUNTER — TELEPHONE (OUTPATIENT)
Dept: CARDIOLOGY CLINIC | Age: 65
End: 2020-01-31

## 2020-07-14 ENCOUNTER — OFFICE VISIT (OUTPATIENT)
Dept: CARDIOLOGY CLINIC | Age: 65
End: 2020-07-14
Payer: COMMERCIAL

## 2020-07-14 VITALS
HEART RATE: 68 BPM | SYSTOLIC BLOOD PRESSURE: 158 MMHG | BODY MASS INDEX: 49.44 KG/M2 | HEIGHT: 67 IN | DIASTOLIC BLOOD PRESSURE: 100 MMHG | WEIGHT: 315 LBS

## 2020-07-14 PROCEDURE — 99214 OFFICE O/P EST MOD 30 MIN: CPT | Performed by: NUCLEAR MEDICINE

## 2020-07-14 RX ORDER — IRBESARTAN 300 MG/1
300 TABLET ORAL DAILY
COMMUNITY
End: 2020-07-14 | Stop reason: SDUPTHER

## 2020-07-14 RX ORDER — IRBESARTAN 300 MG/1
300 TABLET ORAL DAILY
Qty: 90 TABLET | Refills: 3 | Status: SHIPPED | OUTPATIENT
Start: 2020-07-14 | End: 2021-04-06 | Stop reason: SDUPTHER

## 2020-07-14 NOTE — PROGRESS NOTES
[address] [place] [address]  Dept: [phone]  Dept Fax: [phone]  Loc: [phone]    Visit Date: 2020    Sam Escoto is a 60 y.o. male who presents todayfor:  Chief Complaint   Patient presents with    Check-Up    Coronary Artery Disease    Hyperlipidemia    Hypertension   severe obesity   Does have uncontrolled HTN   No chest pain per se  Does have some dyspnea at baseline  Previous carotid stenting   On repatha   BP is higher but not that high at home  Going for prostate surgery   On no statins   No changes in breathing  Wants to discuss testosterone  Previous MI  No chest pain per se      HPI:  HPI  Past Medical History:   Diagnosis Date    Arthritis     Hyperlipidemia     Hypertension     Other disorders of kidney and ureter in diseases classified elsewhere     low testosterone- gets shots [id number]      Past Surgical History:   Procedure Laterality Date    COLONOSCOPY  2008    normal     Family History   Problem Relation Age of Onset    Cancer Mother     Thyroid Disease Mother     Cancer Father     Diabetes Father     Thyroid Disease Sister     Early Death Brother     Cancer Paternal Uncle     Alzheimer's Disease Maternal Grandmother     Early Death Maternal Grandfather     Heart Attack Maternal Grandfather     Diabetes Paternal Grandmother      Social History     Tobacco Use    Smoking status: Former Smoker     Types: Cigarettes     Last attempt to quit: 1980     Years since quittin.1    Smokeless tobacco: Never Used   Substance Use Topics    Alcohol use:  Yes     Alcohol/week: 24.0 standard drinks     Types: 24 Cans of beer per week      Current Outpatient Medications   Medication Sig Dispense Refill    Evolocumab (REPATHA SURECLICK) 148 MG/ML SOAJ Inject 140 mg into the skin every 14 days (Patient not taking: Reported on 2020) 2 pen 11    clopidogrel (PLAVIX) 75 MG tablet Take 1 tablet by mouth daily 90 tablet 3    metoprolol tartrate (LOPRESSOR) 25 MG tablet Take 1 tablet by mouth 2 times daily 180 tablet 3    irbesartan (AVAPRO) 150 MG tablet Take 1 tablet by mouth daily 90 tablet 3    Icosapent Ethyl (VASCEPA) 1 g CAPS capsule Take 1 capsule by mouth daily 30 capsule 11    pravastatin (PRAVACHOL) 40 MG tablet Take 20 mg by mouth every other day       Coenzyme Q10 (CO Q-10) 100 MG CAPS Take by mouth      aspirin 81 MG chewable tablet Take 1 tablet by mouth daily 30 tablet 3    nitroGLYCERIN (NITROSTAT) 0.4 MG SL tablet Place 1 tablet under the tongue every 5 minutes as needed for Chest pain up to max of 3 total doses. If no relief after 1 dose, call 911. 25 tablet 3    tamsulosin (FLOMAX) 0.4 MG capsule Take 0.4 mg by mouth daily       No current facility-administered medications for this visit.       Allergies   Allergen Reactions    Lipitor [Atorvastatin] Other (See Comments)     Hard time breathing       Crestor [Rosuvastatin Calcium]      Joint pains      Pravastatin      ache     Health Maintenance   Topic Date Due    Hepatitis C screen  1955    HIV screen  01/20/1971    DTaP/Tdap/Td vaccine (1 - Tdap) 01/20/1975    Diabetes screen  01/20/1996    Shingles Vaccine (1 of 2) 01/20/2006    Colon cancer screen colonoscopy  01/20/2006    Lipid screen  05/22/2019    Potassium monitoring  05/22/2019    Creatinine monitoring  05/22/2019    Flu vaccine (1) 09/01/2020    Hepatitis A vaccine  Aged Out    Hepatitis B vaccine  Aged Out    Hib vaccine  Aged Out    Meningococcal (ACWY) vaccine  Aged Out    Pneumococcal 0-64 years Vaccine  Aged Out       Subjective:  Review of Systems  General:   No fever, no chills, some fatigue or weight loss  Pulmonary:    some dyspnea, no wheezing  Cardiac:    Denies recent chest pain,   GI:     No nausea or vomiting, no abdominal pain  Neuro:     No dizziness or light headedness,   Musculoskeletal:  No recent active issues  Extremities:   No edema, no obvious claudication       Objective:  Physical Exam  BP (!) 186/102   Pulse 68   Ht 5' 7\" (1.702 m)   Wt (!) 330 lb 11 oz (150 kg)   BMI 51.79 kg/m²   General:   Well developed, well nourished  Lungs:    Clear to auscultation  Heart:    Normal S1 S2, Slight murmur. no rubs, no gallops  Abdomen:   Soft, non tender, no organomegalies, positive bowel sounds  Extremities:   No edema, no cyanosis, good peripheral pulses  Neurological:   Awake, alert, oriented. No obvious focal deficits  Musculoskelatal:  No obvious deformities    Assessment:      Diagnosis Orders   1. Familial hypercholesterolemia     2. Essential hypertension     3. PVD (peripheral vascular disease) ([address]     4. Coronary artery disease involving native coronary artery of native heart without angina pectoris     higher risk for CAD  Discussed testosterone risk at length   BP needs addressed  Likely sleep apnea  Discussed at length     Plan:  No follow-ups on file. Change avapro 300 daily   Continue risk factor modification and medical management  Thank you for allowing me to participate in the care of your patient. Please don't hesitate to contact me regarding any further issues related to the patient care    Orders Placed:  No orders of the defined types were placed in this encounter. Medications Prescribed:  No orders of the defined types were placed in this encounter. Discussed use, benefit, and side effects of prescribed medications. All patient questions answered. Pt voicedunderstanding. Instructed to continue current medications, diet and exercise. Continue risk factor modification and medical management. Patient agreed with treatment plan. Follow up as directed.     Electronically signedby Obi Rizvi MD on 7/14/2020 at 1:17 PM

## 2020-07-14 NOTE — PROGRESS NOTES
Patient here for check up. Patient didn't bring medication list or bottles today. Patient states he doesn't know what he takes. Patient will call office with updated medications.

## 2020-09-28 RX ORDER — CLOPIDOGREL BISULFATE 75 MG/1
75 TABLET ORAL DAILY
Qty: 90 TABLET | Refills: 2 | Status: SHIPPED | OUTPATIENT
Start: 2020-09-28 | End: 2021-04-06 | Stop reason: SDUPTHER

## 2020-09-28 RX ORDER — NITROGLYCERIN 0.4 MG/1
0.4 TABLET SUBLINGUAL EVERY 5 MIN PRN
Qty: 25 TABLET | Refills: 3 | Status: SHIPPED | OUTPATIENT
Start: 2020-09-28

## 2020-09-28 NOTE — TELEPHONE ENCOUNTER
Eliel Lackey called requesting a refill on the following medications:  Requested Prescriptions     Pending Prescriptions Disp Refills    nitroGLYCERIN (NITROSTAT) 0.4 MG SL tablet 25 tablet 3     Sig: Place 1 tablet under the tongue every 5 minutes as needed for Chest pain up to max of 3 total doses. If no relief after 1 dose, call 911.  metoprolol tartrate (LOPRESSOR) 25 MG tablet 180 tablet 3     Sig: Take 1 tablet by mouth 2 times daily    clopidogrel (PLAVIX) 75 MG tablet 90 tablet 3     Sig: Take 1 tablet by mouth daily     Pharmacy verified: Lamont Yeung in [facility]      Date of last visit: 7/14/2020  Date of next visit (if applicable): Visit date not found

## 2021-04-06 ENCOUNTER — OFFICE VISIT (OUTPATIENT)
Dept: CARDIOLOGY CLINIC | Age: 66
End: 2021-04-06
Payer: MEDICARE

## 2021-04-06 VITALS
DIASTOLIC BLOOD PRESSURE: 80 MMHG | HEART RATE: 72 BPM | SYSTOLIC BLOOD PRESSURE: 132 MMHG | BODY MASS INDEX: 50.62 KG/M2 | WEIGHT: 315 LBS | HEIGHT: 66 IN

## 2021-04-06 DIAGNOSIS — I10 ESSENTIAL HYPERTENSION: Primary | ICD-10-CM

## 2021-04-06 DIAGNOSIS — I25.10 CORONARY ARTERY DISEASE INVOLVING NATIVE CORONARY ARTERY OF NATIVE HEART WITHOUT ANGINA PECTORIS: ICD-10-CM

## 2021-04-06 DIAGNOSIS — E78.01 FAMILIAL HYPERCHOLESTEROLEMIA: ICD-10-CM

## 2021-04-06 DIAGNOSIS — I73.9 PVD (PERIPHERAL VASCULAR DISEASE) (HCC): ICD-10-CM

## 2021-04-06 PROCEDURE — 99214 OFFICE O/P EST MOD 30 MIN: CPT | Performed by: NUCLEAR MEDICINE

## 2021-04-06 RX ORDER — IRBESARTAN 300 MG/1
150 TABLET ORAL DAILY
Qty: 90 TABLET | Refills: 3 | Status: SHIPPED | OUTPATIENT
Start: 2021-04-06 | End: 2022-01-18 | Stop reason: SDUPTHER

## 2021-04-06 RX ORDER — TESTOSTERONE 200 MG
PELLET (EA) IMPLANTATION
COMMUNITY

## 2021-04-06 RX ORDER — ALLOPURINOL 300 MG/1
300 TABLET ORAL DAILY
COMMUNITY

## 2021-04-06 RX ORDER — CLOPIDOGREL BISULFATE 75 MG/1
75 TABLET ORAL DAILY
Qty: 90 TABLET | Refills: 3 | Status: SHIPPED | OUTPATIENT
Start: 2021-04-06 | End: 2022-01-18 | Stop reason: SDUPTHER

## 2021-04-06 RX ORDER — TAMSULOSIN HYDROCHLORIDE 0.4 MG/1
0.4 CAPSULE ORAL DAILY
COMMUNITY

## 2021-04-06 NOTE — PROGRESS NOTES
[address] [address] [address] [facility],4[address] [address] [facility]  Dept: [phone]  Dept Fax: [phone]  Loc: [phone]    Visit Date: 2021    aCtie Bolanos is a 73 y.o. male who presents todayfor:  Chief Complaint   Patient presents with    Follow-up    Hypertension    Hyperlipidemia    Obesity    Coronary Artery Disease   had issues with statins and repatha  Off of cholesterol meds  Severe obesity   Previous MI   As well carotid stenting   Does have dyspnea on exertion   Limited by weight  signs of sleep apnea   BP seems stable  No dizziness  Higher risk patient       HPI:  HPI  Past Medical History:   Diagnosis Date    Arthritis     Hyperlipidemia     Hypertension     Other disorders of kidney and ureter in diseases classified elsewhere     low testosterone- gets shots [id number]      Past Surgical History:   Procedure Laterality Date    COLONOSCOPY  2008    normal     Family History   Problem Relation Age of Onset    Cancer Mother     Thyroid Disease Mother     Cancer Father     Diabetes Father     Thyroid Disease Sister     Early Death Brother     Cancer Paternal Uncle     Alzheimer's Disease Maternal Grandmother     Early Death Maternal Grandfather     Heart Attack Maternal Grandfather     Diabetes Paternal Grandmother      Social History     Tobacco Use    Smoking status: Former Smoker     Types: Cigarettes     Quit date: 1980     Years since quittin.9    Smokeless tobacco: Never Used   Substance Use Topics    Alcohol use: Yes     Alcohol/week: 24.0 standard drinks     Types: 24 Cans of beer per week      Current Outpatient Medications   Medication Sig Dispense Refill    Testosterone 200 MG PLLT Inject as directed.        allopurinol (ZYLOPRIM) 300 MG tablet Take 300 mg by mouth daily      tamsulosin (FLOMAX) 0.4 MG capsule Take 0.4 mg by mouth daily      nitroGLYCERIN (NITROSTAT) 0.4 MG SL tablet Place 1 tablet under the tongue every 5 minutes as needed for Chest pain up to max of 3 total doses. If no relief after 1 dose, call 911. 25 tablet 3    metoprolol tartrate (LOPRESSOR) 25 MG tablet Take 1 tablet by mouth 2 times daily 180 tablet 2    clopidogrel (PLAVIX) 75 MG tablet Take 1 tablet by mouth daily 90 tablet 2    irbesartan (AVAPRO) 300 MG tablet Take 1 tablet by mouth daily (Patient taking differently: Take 150 mg by mouth daily ) 90 tablet 3    Coenzyme Q10 (CO Q-10) 100 MG CAPS Take by mouth      aspirin 81 MG chewable tablet Take 1 tablet by mouth daily 30 tablet 3     No current facility-administered medications for this visit.       Allergies   Allergen Reactions    Lipitor [Atorvastatin] Other (See Comments)     Hard time breathing       Crestor [Rosuvastatin Calcium]      Joint pains      Pravastatin      ache     Health Maintenance   Topic Date Due    AAA screen  Never done    Hepatitis C screen  Never done    HIV screen  Never done    COVID-19 Vaccine (1) Never done    DTaP/Tdap/Td vaccine (1 - Tdap) 01/20/1975    Diabetes screen  Never done    Colon cancer screen colonoscopy  Never done    Potassium monitoring  05/22/2019    Creatinine monitoring  05/22/2019    Pneumococcal 65+ years Vaccine (1 of 1 - PPSV23) Never done    Lipid screen  05/22/2023    Flu vaccine  Completed    Shingles Vaccine  Completed    Hepatitis A vaccine  Aged Out    Hepatitis B vaccine  Aged Out    Hib vaccine  Aged Out    Meningococcal (ACWY) vaccine  Aged Out       Subjective:  Review of Systems  General:   No fever, no chills, some fatigue or weight loss  Pulmonary:   some dyspnea, no wheezing  Cardiac:    Denies recent chest pain,   GI:     No nausea or vomiting, no abdominal pain  Neuro:     No dizziness or light headedness,   Musculoskeletal:  No recent active issues  Extremities:   No edema, no obvious claudication       Objective:  Physical Exam  /80   Pulse 72   Ht 5' 6\" (1.676 m)   Wt (!) 337 lb 4.9 oz (153 kg)   BMI 54.44 kg/m²   General:   Well developed, well nourished  Lungs:    Clear to auscultation  Heart:    Normal S1 S2, Slight murmur. no rubs, no gallops  Abdomen:   Soft, non tender, no organomegalies, positive bowel sounds  Extremities:   No edema, no cyanosis, good peripheral pulses  Neurological:   Awake, alert, oriented. No obvious focal deficits  Musculoskelatal:  No obvious deformities    Assessment:      Diagnosis Orders   1. Essential hypertension     2. PVD (peripheral vascular disease) ([facility] [address]     3. Familial hypercholesterolemia     4. Coronary artery disease involving native coronary artery of native heart without angina pectoris       Higher risk patient  Likely sleep apnea  He is not interested in getting a sleep study     Plan:  No follow-ups on file. Discussed  Non invasive cardiac testing will be ordered to further evaluate for any ischemic or structural heart disease as a cause of the patient symptoms. We will proceed with a Stress Cardiolite test and echo soon. Discussed statins again  He is not interested in re trying   Continue risk factor modification and medical management,  Kasey De La Rosa you for allowing me to participate in the care of your patient. Please don't hesitate to contact me regarding any further issues related to the patient care    Orders Placed:  No orders of the defined types were placed in this encounter. Medications Prescribed:  No orders of the defined types were placed in this encounter. Discussed use, benefit, and side effects of prescribed medications. All patient questions answered. Pt voicedunderstanding. Instructed to continue current medications, diet and exercise. Continue risk factor modification and medical management. Patient agreed with treatment plan. Follow up as directed.     Electronically signedby Ranjith Henson MD on 4/6/2021 at 11:19 AM

## 2021-04-08 ENCOUNTER — TELEPHONE (OUTPATIENT)
Dept: CARDIOLOGY CLINIC | Age: 66
End: 2021-04-08

## 2021-05-04 ENCOUNTER — TELEPHONE (OUTPATIENT)
Dept: CARDIOLOGY CLINIC | Age: 66
End: 2021-05-04

## 2021-05-05 DIAGNOSIS — E78.01 FAMILIAL HYPERCHOLESTEROLEMIA: ICD-10-CM

## 2021-05-05 DIAGNOSIS — I25.10 CORONARY ARTERY DISEASE INVOLVING NATIVE CORONARY ARTERY OF NATIVE HEART WITHOUT ANGINA PECTORIS: ICD-10-CM

## 2021-05-05 DIAGNOSIS — I73.9 PVD (PERIPHERAL VASCULAR DISEASE) (HCC): ICD-10-CM

## 2021-05-05 DIAGNOSIS — I10 ESSENTIAL HYPERTENSION: ICD-10-CM

## 2021-05-13 ENCOUNTER — TELEPHONE (OUTPATIENT)
Dept: CARDIOLOGY CLINIC | Age: 66
End: 2021-05-13

## 2021-06-15 ENCOUNTER — OFFICE VISIT (OUTPATIENT)
Dept: CARDIOLOGY CLINIC | Age: 66
End: 2021-06-15
Payer: MEDICARE

## 2021-06-15 VITALS
HEIGHT: 66 IN | DIASTOLIC BLOOD PRESSURE: 80 MMHG | BODY MASS INDEX: 50.62 KG/M2 | WEIGHT: 315 LBS | HEART RATE: 84 BPM | SYSTOLIC BLOOD PRESSURE: 132 MMHG

## 2021-06-15 DIAGNOSIS — I25.5 ISCHEMIC CARDIOMYOPATHY: Primary | ICD-10-CM

## 2021-06-15 DIAGNOSIS — E78.01 FAMILIAL HYPERCHOLESTEROLEMIA: ICD-10-CM

## 2021-06-15 DIAGNOSIS — I10 ESSENTIAL HYPERTENSION: ICD-10-CM

## 2021-06-15 PROCEDURE — 1036F TOBACCO NON-USER: CPT | Performed by: NUCLEAR MEDICINE

## 2021-06-15 PROCEDURE — 3017F COLORECTAL CA SCREEN DOC REV: CPT | Performed by: NUCLEAR MEDICINE

## 2021-06-15 PROCEDURE — 1123F ACP DISCUSS/DSCN MKR DOCD: CPT | Performed by: NUCLEAR MEDICINE

## 2021-06-15 PROCEDURE — G8427 DOCREV CUR MEDS BY ELIG CLIN: HCPCS | Performed by: NUCLEAR MEDICINE

## 2021-06-15 PROCEDURE — 4040F PNEUMOC VAC/ADMIN/RCVD: CPT | Performed by: NUCLEAR MEDICINE

## 2021-06-15 PROCEDURE — 99214 OFFICE O/P EST MOD 30 MIN: CPT | Performed by: NUCLEAR MEDICINE

## 2021-06-15 PROCEDURE — G8417 CALC BMI ABV UP PARAM F/U: HCPCS | Performed by: NUCLEAR MEDICINE

## 2021-06-15 RX ORDER — PITAVASTATIN CALCIUM 2.09 MG/1
2 TABLET, FILM COATED ORAL DAILY
Qty: 90 TABLET | Refills: 3 | Status: SHIPPED
Start: 2021-06-15 | End: 2021-07-28

## 2021-06-15 RX ORDER — PITAVASTATIN CALCIUM 2.09 MG/1
2 TABLET, FILM COATED ORAL DAILY
COMMUNITY
End: 2021-06-15 | Stop reason: SDUPTHER

## 2021-06-15 NOTE — PROGRESS NOTES
[address] [address] [address] [facility],4[address] [address] [facility]  Dept: [phone]  Dept Fax: [phone]  Loc: [phone]    Visit Date: 6/15/2021    Abdi Sanchez is a 73 y.o. male who presents todayfor:  Chief Complaint   Patient presents with    Check-Up     discuss stress test    Hypertension    Coronary Artery Disease    Cardiomyopathy    Hyperlipidemia   did have MI before  stress test with inferior apical infarct   Does have severe obesity   Likely sleep apnea  No chest pain  some dyspnea on exertion   No dizziness  No syncope  BP seems to be stable  Had issues with statins and Repatha  No arrhythmia  Know carotid disease       HPI:  HPI  Past Medical History:   Diagnosis Date    Arthritis     Hyperlipidemia     Hypertension     Other disorders of kidney and ureter in diseases classified elsewhere     low testosterone- gets shots [id number]      Past Surgical History:   Procedure Laterality Date    COLONOSCOPY  2008    normal     Family History   Problem Relation Age of Onset    Cancer Mother     Thyroid Disease Mother     Cancer Father     Diabetes Father     Thyroid Disease Sister     Early Death Brother     Cancer Paternal Uncle     Alzheimer's Disease Maternal Grandmother     Early Death Maternal Grandfather     Heart Attack Maternal Grandfather     Diabetes Paternal Grandmother      Social History     Tobacco Use    Smoking status: Former Smoker     Types: Cigarettes     Quit date: 1980     Years since quittin.0    Smokeless tobacco: Never Used   Substance Use Topics    Alcohol use: Yes     Alcohol/week: 24.0 standard drinks     Types: 24 Cans of beer per week      Current Outpatient Medications   Medication Sig Dispense Refill    Testosterone 200 MG PLLT Inject as directed.        allopurinol (ZYLOPRIM) 300 MG tablet Take 300 mg by mouth daily      tamsulosin (FLOMAX) 0.4 MG capsule Take 0.4 mg by mouth daily  clopidogrel (PLAVIX) 75 MG tablet Take 1 tablet by mouth daily 90 tablet 3    metoprolol tartrate (LOPRESSOR) 25 MG tablet Take 1 tablet by mouth 2 times daily 180 tablet 3    irbesartan (AVAPRO) 300 MG tablet Take 0.5 tablets by mouth daily 90 tablet 3    nitroGLYCERIN (NITROSTAT) 0.4 MG SL tablet Place 1 tablet under the tongue every 5 minutes as needed for Chest pain up to max of 3 total doses. If no relief after 1 dose, call 911. 25 tablet 3    aspirin 81 MG chewable tablet Take 1 tablet by mouth daily 30 tablet 3     No current facility-administered medications for this visit.      Allergies   Allergen Reactions    Lipitor [Atorvastatin] Other (See Comments)     Hard time breathing       Crestor [Rosuvastatin Calcium]      Joint pains      Pravastatin      ache     Health Maintenance   Topic Date Due    AAA screen  Never done    Hepatitis C screen  Never done    COVID-19 Vaccine (1) Never done    HIV screen  Never done    DTaP/Tdap/Td vaccine (1 - Tdap) 01/20/1975    Diabetes screen  Never done    Colon cancer screen colonoscopy  Never done    Potassium monitoring  05/22/2019    Creatinine monitoring  05/22/2019    Pneumococcal 65+ years Vaccine (1 of 1 - PPSV23) Never done   [employer] Visit (AWV)  Never done    Lipid screen  05/22/2023    Flu vaccine  Completed    Shingles Vaccine  Completed    Hepatitis A vaccine  Aged Out    Hepatitis B vaccine  Aged Out    Hib vaccine  Aged Out    Meningococcal (ACWY) vaccine  Aged Out       Subjective:  Review of Systems  General:   No fever, no chills,some fatigue or weight loss  Pulmonary:    some dyspnea, no wheezing  Cardiac:    Denies recent chest pain,   GI:     No nausea or vomiting, no abdominal pain  Neuro:    No dizziness or light headedness,   Musculoskeletal:  No recent active issues  Extremities:   No edema, no obvious claudication       Objective:  Physical Exam  /80   Pulse 84   Ht 5' 6\" (1.676 m)   Wt (!) 331 lb

## 2021-06-16 ENCOUNTER — TELEPHONE (OUTPATIENT)
Dept: CARDIOLOGY CLINIC | Age: 66
End: 2021-06-16

## 2021-06-18 ENCOUNTER — TELEPHONE (OUTPATIENT)
Dept: CARDIOLOGY CLINIC | Age: 66
End: 2021-06-18

## 2021-06-18 NOTE — TELEPHONE ENCOUNTER
Livalo 2 mg 1 tab po daily approved. Approval valid starting 6/17/2021 until further notice. Pharmacy aware of approval.    Per [address] [place] medication is $590.68 for a 90 day supply. I called patient to inform him of approval, left a vm to have him or his wife return my call.

## 2021-06-21 NOTE — TELEPHONE ENCOUNTER
Spoke with Patient's wife Deuce Hough. Informed her that medication has been approved, and how much a 90 day supply would cost.   I did find a few things via [facility] [employer] that may help bring the cost of the medication down. I also suggested that if they are both retired and on a fixed income, that they may qualify for a patient assistance program. I did menton that this is also based on how much of that fixed income is going towards out of pocket costs for medications. I sent the information to the patient's address on file via mail. No more questions or concerns voiced at this time.

## 2021-07-21 ENCOUNTER — TELEPHONE (OUTPATIENT)
Dept: CARDIOLOGY CLINIC | Age: 66
End: 2021-07-21

## 2021-07-21 NOTE — TELEPHONE ENCOUNTER
Patient's wife Reina Thacker left me a message stating that the cost of Livalo is very expensive. Asked if there was another medication that he could try, or what medication he tried prior to Liptior, and to return her call. Looks like he tried pravastatin, and Vascepa. I tried to reach Reina Thacker at [phone] to ask her about the above medications, she did not answer. I am in clinic today, tomorrow, and off Friday, and off Monday, if someone could help me out and return her call. Thank you.

## 2021-07-23 NOTE — TELEPHONE ENCOUNTER
Spoke with Paula Alfredo. Livalo is too expensive. Pt has tried Lipitor, Pravastatin, Crestor. He did try Repatha and she said it made him feel \"weird\". Please advise.

## 2021-07-27 NOTE — TELEPHONE ENCOUNTER
Spoke with Anca Alethea, pt's wife, asking to try Chester Hides again. Okay to send script to [facility]?

## 2021-07-28 RX ORDER — EVOLOCUMAB 140 MG/ML
140 INJECTION, SOLUTION SUBCUTANEOUS
Qty: 6 PEN | Refills: 3 | Status: SHIPPED | OUTPATIENT
Start: 2021-07-28 | End: 2022-01-18 | Stop reason: ALTCHOICE

## 2021-08-03 NOTE — TELEPHONE ENCOUNTER
Spoke with agapito and both praulent and repatha are too expensive     Yessenia Brown is wondering if he can try crestor 10mg again?     rx will need to [facility]

## 2021-08-04 RX ORDER — ROSUVASTATIN CALCIUM 10 MG/1
10 TABLET, COATED ORAL DAILY
Qty: 30 TABLET | Refills: 3 | Status: SHIPPED
Start: 2021-08-04 | End: 2022-01-18 | Stop reason: ALTCHOICE

## 2021-08-04 RX ORDER — ROSUVASTATIN CALCIUM 10 MG/1
10 TABLET, COATED ORAL DAILY
Qty: 30 TABLET | Refills: 3 | Status: CANCELLED | OUTPATIENT
Start: 2021-08-04

## 2022-01-18 ENCOUNTER — OFFICE VISIT (OUTPATIENT)
Dept: CARDIOLOGY CLINIC | Age: 67
End: 2022-01-18
Payer: MEDICARE

## 2022-01-18 VITALS
DIASTOLIC BLOOD PRESSURE: 96 MMHG | SYSTOLIC BLOOD PRESSURE: 162 MMHG | WEIGHT: 315 LBS | BODY MASS INDEX: 50.62 KG/M2 | HEART RATE: 76 BPM | HEIGHT: 66 IN

## 2022-01-18 DIAGNOSIS — I10 PRIMARY HYPERTENSION: ICD-10-CM

## 2022-01-18 DIAGNOSIS — E78.01 FAMILIAL HYPERCHOLESTEROLEMIA: ICD-10-CM

## 2022-01-18 DIAGNOSIS — I25.10 CORONARY ARTERY DISEASE INVOLVING NATIVE CORONARY ARTERY OF NATIVE HEART WITHOUT ANGINA PECTORIS: Primary | ICD-10-CM

## 2022-01-18 PROCEDURE — 1123F ACP DISCUSS/DSCN MKR DOCD: CPT | Performed by: NUCLEAR MEDICINE

## 2022-01-18 PROCEDURE — 1036F TOBACCO NON-USER: CPT | Performed by: NUCLEAR MEDICINE

## 2022-01-18 PROCEDURE — G8427 DOCREV CUR MEDS BY ELIG CLIN: HCPCS | Performed by: NUCLEAR MEDICINE

## 2022-01-18 PROCEDURE — 99213 OFFICE O/P EST LOW 20 MIN: CPT | Performed by: NUCLEAR MEDICINE

## 2022-01-18 PROCEDURE — G8417 CALC BMI ABV UP PARAM F/U: HCPCS | Performed by: NUCLEAR MEDICINE

## 2022-01-18 PROCEDURE — G8484 FLU IMMUNIZE NO ADMIN: HCPCS | Performed by: NUCLEAR MEDICINE

## 2022-01-18 PROCEDURE — 3017F COLORECTAL CA SCREEN DOC REV: CPT | Performed by: NUCLEAR MEDICINE

## 2022-01-18 PROCEDURE — 4040F PNEUMOC VAC/ADMIN/RCVD: CPT | Performed by: NUCLEAR MEDICINE

## 2022-01-18 RX ORDER — CLOPIDOGREL BISULFATE 75 MG/1
75 TABLET ORAL DAILY
Qty: 90 TABLET | Refills: 3 | Status: SHIPPED | OUTPATIENT
Start: 2022-01-18

## 2022-01-18 RX ORDER — IRBESARTAN 300 MG/1
150 TABLET ORAL DAILY
Qty: 90 TABLET | Refills: 3 | Status: SHIPPED | OUTPATIENT
Start: 2022-01-18 | End: 2022-05-04

## 2022-01-18 NOTE — PROGRESS NOTES
[address] [address] [address] [facility],4Th Floor [address] [facility]  Dept: [phone]  Dept Fax: [phone]  Loc: [phone]    Visit Date: 2022    Gin Alvarez is a 73 y.o. male who presents todayfor:  Chief Complaint   Patient presents with    Check-Up    Hypertension    Coronary Artery Disease    Hyperlipidemia   known MI   And stents  Chronic dyspnea  Sleep apnea in progressive  Weight is an issue  Bp is stable  Higher today   No dizziness  No syncope  On statins for hyperlipidemia        HPI:  HPI  Past Medical History:   Diagnosis Date    Arthritis     Hyperlipidemia     Hypertension     Other disorders of kidney and ureter in diseases classified elsewhere     low testosterone- gets shots [id number]      Past Surgical History:   Procedure Laterality Date    COLONOSCOPY  2008    normal     Family History   Problem Relation Age of Onset    Cancer Mother     Thyroid Disease Mother     Cancer Father     Diabetes Father     Thyroid Disease Sister     Early Death Brother     Cancer Paternal Uncle     Alzheimer's Disease Maternal Grandmother     Early Death Maternal Grandfather     Heart Attack Maternal Grandfather     Diabetes Paternal Grandmother      Social History     Tobacco Use    Smoking status: Former Smoker     Types: Cigarettes     Quit date: 1980     Years since quittin.6    Smokeless tobacco: Never Used   Substance Use Topics    Alcohol use: Yes     Alcohol/week: 24.0 standard drinks     Types: 24 Cans of beer per week      Current Outpatient Medications   Medication Sig Dispense Refill    Testosterone 200 MG PLLT Inject as directed.        allopurinol (ZYLOPRIM) 300 MG tablet Take 300 mg by mouth daily      tamsulosin (FLOMAX) 0.4 MG capsule Take 0.4 mg by mouth daily      clopidogrel (PLAVIX) 75 MG tablet Take 1 tablet by mouth daily 90 tablet 3    metoprolol tartrate (LOPRESSOR) 25 MG tablet Take 1 tablet by mouth 2 times daily 180 tablet 3    irbesartan (AVAPRO) 300 MG tablet Take 0.5 tablets by mouth daily 90 tablet 3    nitroGLYCERIN (NITROSTAT) 0.4 MG SL tablet Place 1 tablet under the tongue every 5 minutes as needed for Chest pain up to max of 3 total doses. If no relief after 1 dose, call 911. 25 tablet 3    aspirin 81 MG chewable tablet Take 1 tablet by mouth daily 30 tablet 3     No current facility-administered medications for this visit.      Allergies   Allergen Reactions    Lipitor [Atorvastatin] Other (See Comments)     Hard time breathing       Crestor [Rosuvastatin Calcium]      Joint pains      Pravastatin      ache     Health Maintenance   Topic Date Due    AAA screen  Never done    Hepatitis C screen  Never done    COVID-19 Vaccine (1) Never done    Depression Screen  Never done    HIV screen  Never done    Diabetes screen  Never done    Colon cancer screen colonoscopy  Never done    DTaP/Tdap/Td vaccine (1 - Tdap) 01/02/2007    Potassium monitoring  05/22/2019    Creatinine monitoring  05/22/2019    Annual Wellness Visit (AWV)  Never done    Lipid screen  05/22/2023    Flu vaccine  Completed    Shingles Vaccine  Completed    Pneumococcal 65+ years Vaccine  Completed    Hepatitis A vaccine  Aged Out    Hepatitis B vaccine  Aged Out    Hib vaccine  Aged Out    Meningococcal (ACWY) vaccine  Aged Out       Subjective:  Review of Systems  General:   No fever, no chills, some fatigue or weight loss  Pulmonary:    some dyspnea, no wheezing  Cardiac:    Denies recent chest pain,   GI:     No nausea or vomiting, no abdominal pain  Neuro:     No dizziness or light headedness,   Musculoskeletal:  No recent active issues  Extremities:   No edema, no obvious claudication       Objective:  Physical Exam  BP (!) 162/96   Pulse 76   Ht 5' 6\" (1.676 m)   Wt (!) 326 lb (147.9 kg)   BMI 52.62 kg/m²   General:   Well developed, well nourished  Lungs:    Clear to auscultation  Heart:

## 2022-05-04 RX ORDER — IRBESARTAN 150 MG/1
150 TABLET ORAL DAILY
Qty: 90 TABLET | Refills: 2 | Status: SHIPPED | OUTPATIENT
Start: 2022-05-04

## 2023-01-23 RX ORDER — CLOPIDOGREL BISULFATE 75 MG/1
75 TABLET ORAL DAILY
Qty: 90 TABLET | Refills: 0 | Status: SHIPPED | OUTPATIENT
Start: 2023-01-23 | End: 2023-01-24 | Stop reason: SDUPTHER

## 2023-01-23 RX ORDER — IRBESARTAN 150 MG/1
150 TABLET ORAL DAILY
Qty: 90 TABLET | Refills: 0 | Status: SHIPPED | OUTPATIENT
Start: 2023-01-23 | End: 2023-01-24 | Stop reason: SDUPTHER

## 2023-01-23 NOTE — TELEPHONE ENCOUNTER
Isabel Adame called requesting a refill on the following medications:  Requested Prescriptions     Pending Prescriptions Disp Refills    clopidogrel (PLAVIX) 75 MG tablet 90 tablet 3     Sig: Take 1 tablet by mouth daily    irbesartan (AVAPRO) 150 MG tablet 90 tablet 2     Sig: Take 1 tablet by mouth daily    metoprolol tartrate (LOPRESSOR) 25 MG tablet 180 tablet 3     Sig: Take 1 tablet by mouth 2 times daily     Pharmacy verified:[facility] [address]  . pv      Date of last visit: 1/18/22  Date of next visit (if applicable): 9-89-21

## 2023-01-24 ENCOUNTER — OFFICE VISIT (OUTPATIENT)
Dept: CARDIOLOGY CLINIC | Age: 68
End: 2023-01-24

## 2023-01-24 VITALS
HEART RATE: 74 BPM | HEIGHT: 66 IN | SYSTOLIC BLOOD PRESSURE: 150 MMHG | WEIGHT: 315 LBS | DIASTOLIC BLOOD PRESSURE: 82 MMHG | BODY MASS INDEX: 50.62 KG/M2

## 2023-01-24 DIAGNOSIS — E78.01 FAMILIAL HYPERCHOLESTEROLEMIA: ICD-10-CM

## 2023-01-24 DIAGNOSIS — I10 PRIMARY HYPERTENSION: ICD-10-CM

## 2023-01-24 DIAGNOSIS — I25.10 CORONARY ARTERY DISEASE INVOLVING NATIVE CORONARY ARTERY OF NATIVE HEART WITHOUT ANGINA PECTORIS: Primary | ICD-10-CM

## 2023-01-24 RX ORDER — IRBESARTAN 150 MG/1
150 TABLET ORAL DAILY
Qty: 90 TABLET | Refills: 0 | Status: SHIPPED | OUTPATIENT
Start: 2023-01-24

## 2023-01-24 RX ORDER — CLOPIDOGREL BISULFATE 75 MG/1
75 TABLET ORAL DAILY
Qty: 90 TABLET | Refills: 3 | Status: SHIPPED | OUTPATIENT
Start: 2023-01-24

## 2023-01-24 RX ORDER — NITROGLYCERIN 0.4 MG/1
0.4 TABLET SUBLINGUAL EVERY 5 MIN PRN
Qty: 25 TABLET | Refills: 3 | Status: SHIPPED | OUTPATIENT
Start: 2023-01-24

## 2023-01-24 NOTE — PROGRESS NOTES
[address] [facility],4Th Floor [address] [facility]  Dept: [phone]  Dept Fax: [phone]  Loc: [phone]    Visit Date: 2023    Shannon Koenig is a 80 y.o. male who presents todayfor:  Chief Complaint   Patient presents with    Follow-up     1 year    Hypertension    Coronary Artery Disease    Hyperlipidemia     Known stents  No chest pain  No changes in breathing  No dizziness  No syncope  BP is stable   Severe obesity   Looking for weight loss      HPI:  HPI  Past Medical History:   Diagnosis Date    Arthritis     Hyperlipidemia     Hypertension     Other disorders of kidney and ureter in diseases classified elsewhere     low testosterone- gets shots [id number]      Past Surgical History:   Procedure Laterality Date    COLONOSCOPY  2008    normal     Family History   Problem Relation Age of Onset    Cancer Mother     Thyroid Disease Mother     Cancer Father     Diabetes Father     Thyroid Disease Sister     Early Death Brother     Cancer Paternal Uncle     Alzheimer's Disease Maternal Grandmother     Early Death Maternal Grandfather     Heart Attack Maternal Grandfather     Diabetes Paternal Grandmother      Social History     Tobacco Use    Smoking status: Former     Types: Cigarettes     Quit date: 1980     Years since quittin.7    Smokeless tobacco: Never   Substance Use Topics    Alcohol use: Yes     Alcohol/week: 24.0 standard drinks     Types: 24 Cans of beer per week      Current Outpatient Medications   Medication Sig Dispense Refill    clopidogrel (PLAVIX) 75 MG tablet Take 1 tablet by mouth daily 90 tablet 0    irbesartan (AVAPRO) 150 MG tablet Take 1 tablet by mouth daily 90 tablet 0    metoprolol tartrate (LOPRESSOR) 25 MG tablet Take 1 tablet by mouth 2 times daily 180 tablet 0    Testosterone 200 MG PLLT Inject as directed.        allopurinol (ZYLOPRIM) 300 MG tablet Take 300 mg by mouth daily      tamsulosin (FLOMAX) 0.4 MG capsule Take 0.4 mg by mouth daily      nitroGLYCERIN (NITROSTAT) 0.4 MG SL tablet Place 1 tablet under the tongue every 5 minutes as needed for Chest pain up to max of 3 total doses. If no relief after 1 dose, call 911. 25 tablet 3    aspirin 81 MG chewable tablet Take 1 tablet by mouth daily 30 tablet 3     No current facility-administered medications for this visit. Allergies   Allergen Reactions    Lipitor [Atorvastatin] Other (See Comments)     Hard time breathing       Crestor [Rosuvastatin Calcium]      Joint pains      Pravastatin      ache     Health Maintenance   Topic Date Due    Depression Screen  Never done    Hepatitis C screen  Never done    Diabetes screen  Never done    Colorectal Cancer Screen  Never done    DTaP/Tdap/Td vaccine (1 - Tdap) 01/02/2007    AAA screen  Never done    Annual Wellness Visit (AWV)  Never done    COVID-19 Vaccine (4 - Booster for Moderna series) 01/04/2022    Lipids  05/22/2023    Flu vaccine  Completed    Shingles vaccine  Completed    Pneumococcal 65+ years Vaccine  Completed    Hepatitis A vaccine  Aged Out    Hib vaccine  Aged Out    Meningococcal (ACWY) vaccine  Aged Out       Subjective:  Review of Systems  General:   No fever, no chills, No fatigue or weight loss  Pulmonary:    No dyspnea, no wheezing  Cardiac:    Denies recent chest pain,   GI:     No nausea or vomiting, no abdominal pain  Neuro:    No dizziness or light headedness,   Musculoskeletal:  No recent active issues  Extremities:   No edema, no obvious claudication     Objective:  Physical Exam  BP (!) 150/82   Pulse 74   Ht 5' 6\" (1.676 m)   Wt (!) 336 lb 12.8 oz (152.8 kg)   BMI 54.36 kg/m²   General:   Well developed, well nourished  Lungs:   Clear to auscultation  Heart:    Normal S1 S2, Slight murmur.  no rubs, no gallops  Abdomen:   Soft, non tender, no organomegalies, positive bowel sounds  Extremities:   No edema, no cyanosis, good peripheral pulses  Neurological:   Awake, alert, oriented. No obvious focal deficits  Musculoskelatal:  No obvious deformities    Assessment:      Diagnosis Orders   1. Coronary artery disease involving native coronary artery of native heart without angina pectoris        2. Primary hypertension        3. Familial hypercholesterolemia        As above  Cardiac fair for now   Needs to lose weight     Plan:  No follow-ups on file. As above  Continue risk factor modification and medical management  Thank you for allowing me to participate in the care of your patient. Please don't hesitate to contact me regarding any further issues related to the patient care    Orders Placed:  No orders of the defined types were placed in this encounter. Medications Prescribed:  No orders of the defined types were placed in this encounter. Discussed use, benefit, and side effects of prescribed medications. All patient questions answered. Pt voicedunderstanding. Instructed to continue current medications, diet and exercise. Continue risk factor modification and medical management. Patient agreed with treatment plan. Follow up as directed.     Electronically signedby Jeff Rucker MD on 1/24/2023 at 12:10 PM

## 2023-05-18 ENCOUNTER — TELEPHONE (OUTPATIENT)
Dept: CARDIOLOGY CLINIC | Age: 68
End: 2023-05-18

## 2023-05-18 NOTE — TELEPHONE ENCOUNTER
Received faxe from [address] [place] [address]  office states pt has had an increase in SOB the past 4 months

## 2023-05-18 NOTE — TELEPHONE ENCOUNTER
Received fax from [facility] [address] office, pt has had an increase of SOB over the past 4 months. Does Dr. Gladys Cedeno want to make any changes to medications or see pt for a f/u appt?  Note scanned in media and attached to this phone encounter

## 2023-05-18 NOTE — TELEPHONE ENCOUNTER
Spoke with pt and he states he has a cold right now   He wants to watch for now and will call the office if he wants a sooner appt

## 2023-08-21 RX ORDER — IRBESARTAN 150 MG/1
150 TABLET ORAL DAILY
Qty: 90 TABLET | Refills: 3 | Status: SHIPPED | OUTPATIENT
Start: 2023-08-21

## 2023-08-21 NOTE — TELEPHONE ENCOUNTER
Reino Simmonds called requesting a refill on the following medications:  Requested Prescriptions     Pending Prescriptions Disp Refills    irbesartan (AVAPRO) 150 MG tablet 90 tablet 0     Sig: Take 1 tablet by mouth daily     [facility] verified:  [facility]      Date of last visit: 01-24-23  Date of next visit (if applicable): 68-01-90

## 2023-09-11 ENCOUNTER — TELEPHONE (OUTPATIENT)
Dept: CARDIOLOGY CLINIC | Age: 68
End: 2023-09-11

## 2023-09-11 NOTE — TELEPHONE ENCOUNTER
Pt wife called states pt has lost 40lbs and hes been dizzy     BP has been   110/60  90/50  100/60  Pt wife asking if BP meds need adjusted   HR has been around 70-80s      [phone] wife

## 2023-10-26 ENCOUNTER — TELEPHONE (OUTPATIENT)
Dept: CARDIOLOGY CLINIC | Age: 68
End: 2023-10-26

## 2023-10-26 NOTE — TELEPHONE ENCOUNTER
Pt wife states pt lost 40 lbs and is always freezing now, asking if thinners can be adjusted or even stopped? Pt had stents and a clot in past. She is just curios?

## 2024-01-23 ENCOUNTER — TELEPHONE (OUTPATIENT)
Dept: CARDIOLOGY CLINIC | Age: 69
End: 2024-01-23

## 2024-01-23 ENCOUNTER — OFFICE VISIT (OUTPATIENT)
Dept: CARDIOLOGY CLINIC | Age: 69
End: 2024-01-23

## 2024-01-23 VITALS
HEART RATE: 72 BPM | WEIGHT: 285.72 LBS | BODY MASS INDEX: 45.92 KG/M2 | HEIGHT: 66 IN | DIASTOLIC BLOOD PRESSURE: 74 MMHG | SYSTOLIC BLOOD PRESSURE: 136 MMHG

## 2024-01-23 DIAGNOSIS — I10 PRIMARY HYPERTENSION: ICD-10-CM

## 2024-01-23 DIAGNOSIS — I25.10 CORONARY ARTERY DISEASE INVOLVING NATIVE CORONARY ARTERY OF NATIVE HEART WITHOUT ANGINA PECTORIS: Primary | ICD-10-CM

## 2024-01-23 RX ORDER — CLOPIDOGREL BISULFATE 75 MG/1
75 TABLET ORAL DAILY
Qty: 90 TABLET | Refills: 3 | Status: SHIPPED | OUTPATIENT
Start: 2024-01-23

## 2024-01-23 NOTE — TELEPHONE ENCOUNTER
Ck Truong called requesting a refill on the following medications:  Requested Prescriptions     Pending Prescriptions Disp Refills    metoprolol tartrate (LOPRESSOR) 25 MG tablet 180 tablet 3     Sig: Take 1 tablet by mouth 2 times daily    clopidogrel (PLAVIX) 75 MG tablet 90 tablet 3     Sig: Take 1 tablet by mouth daily     Pharmacy verified:  [facility]      Date of last visit: 01-23-24  Date of next visit (if applicable): 01-28-25

## 2024-01-23 NOTE — PROGRESS NOTES
[facility] [place] [facility]  [facility] CARDIOLOGY  [address] [place] [address]  Dept: [phone]  Dept Fax: [phone]  Loc: [phone]    Visit Date: 2024    Ckkathy Truong is a 69 y.o. male who presents todayfor:  Chief Complaint   Patient presents with    Hypertension    Coronary Artery Disease     Lost 50 lbs  Known stents  No chest pain  No changes in breathing  BP is stable  No dizziness  No syncope      HPI:  HPI  Past Medical History:   Diagnosis Date    Arthritis     Hyperlipidemia     Hypertension     Other disorders of kidney and ureter in diseases classified elsewhere     low testosterone- gets shots [id number]      Past Surgical History:   Procedure Laterality Date    COLONOSCOPY  2008    normal     Family History   Problem Relation Age of Onset    Cancer Mother     Thyroid Disease Mother     Cancer Father     Diabetes Father     Thyroid Disease Sister     Early Death Brother     Cancer Paternal Uncle     Alzheimer's Disease Maternal Grandmother     Early Death Maternal Grandfather     Heart Attack Maternal Grandfather     Diabetes Paternal Grandmother      Social History     Tobacco Use    Smoking status: Former     Current packs/day: 0.00     Types: Cigarettes     Quit date: 1980     Years since quittin.7    Smokeless tobacco: Never   Substance Use Topics    Alcohol use: Yes     Alcohol/week: 24.0 standard drinks of alcohol     Types: 24 Cans of beer per week      Current Outpatient Medications   Medication Sig Dispense Refill    Semaglutide (OZEMPIC, 2 MG/DOSE, SC) Inject into the skin      clopidogrel (PLAVIX) 75 MG tablet Take 1 tablet by mouth daily 90 tablet 3    metoprolol tartrate (LOPRESSOR) 25 MG tablet Take 1 tablet by mouth 2 times daily 180 tablet 3    nitroGLYCERIN (NITROSTAT) 0.4 MG SL tablet Place 1 tablet under the tongue every 5 minutes as needed for Chest pain up to max of 3 total doses. If no relief after 1 dose, call [phone]

## 2024-01-23 NOTE — TELEPHONE ENCOUNTER
PSA level scanned in chart.   LM with PCP to call our office back to make sure they are following up with this.

## 2024-02-26 ENCOUNTER — TELEPHONE (OUTPATIENT)
Dept: CARDIOLOGY CLINIC | Age: 69
End: 2024-02-26

## 2024-02-26 NOTE — TELEPHONE ENCOUNTER
Wife Lino on  saying Friday and satruday pt was feeling dizzy and fatigued BP was 106/60 HR 68  98/70 HR 88    They held the metoprolol over the weekend and his BP was 120/70  HR 80  Today he was less tired, and less dizzy   Pt has a weight loss of more than 50lbs recently

## 2024-04-15 ENCOUNTER — TELEPHONE (OUTPATIENT)
Dept: CARDIOLOGY CLINIC | Age: 69
End: 2024-04-15

## 2024-04-15 NOTE — TELEPHONE ENCOUNTER
Pt last seen by Dr. Ledesma 1-23-24.  Pt is needing clearance for Biopsy of Prostate with Dr. Deleon on 4-22-24.  Can pt stop ASA 1 week prior and Plavix 5 days prior?    Fax [phone]

## 2024-05-15 ENCOUNTER — HOSPITAL ENCOUNTER (OUTPATIENT)
Dept: MRI IMAGING | Age: 69
Discharge: HOME | End: 2024-05-15
Attending: RADIOLOGY

## 2024-05-15 DIAGNOSIS — Z00.6 EXAM FOR CLINICAL RESEARCH: ICD-10-CM

## 2024-05-17 ENCOUNTER — INITIAL CONSULT (OUTPATIENT)
Dept: RADIATION ONCOLOGY | Age: 69
End: 2024-05-17
Payer: MEDICARE

## 2024-05-17 ENCOUNTER — SOCIAL WORK (OUTPATIENT)
Dept: ONCOLOGY | Age: 69
End: 2024-05-17

## 2024-05-17 VITALS
HEART RATE: 90 BPM | OXYGEN SATURATION: 96 % | RESPIRATION RATE: 20 BRPM | TEMPERATURE: 97.2 F | SYSTOLIC BLOOD PRESSURE: 160 MMHG | DIASTOLIC BLOOD PRESSURE: 88 MMHG | WEIGHT: 283.51 LBS | HEIGHT: 67 IN | BODY MASS INDEX: 44.5 KG/M2

## 2024-05-17 DIAGNOSIS — C61 MALIGNANT NEOPLASM OF PROSTATE (HCC): Primary | ICD-10-CM

## 2024-05-17 PROCEDURE — 99205 OFFICE O/P NEW HI 60 MIN: CPT | Performed by: RADIOLOGY

## 2024-05-17 PROCEDURE — 1123F ACP DISCUSS/DSCN MKR DOCD: CPT | Performed by: RADIOLOGY

## 2024-05-17 PROCEDURE — 1036F TOBACCO NON-USER: CPT | Performed by: RADIOLOGY

## 2024-05-17 PROCEDURE — 3079F DIAST BP 80-89 MM HG: CPT | Performed by: RADIOLOGY

## 2024-05-17 PROCEDURE — G8427 DOCREV CUR MEDS BY ELIG CLIN: HCPCS | Performed by: RADIOLOGY

## 2024-05-17 PROCEDURE — G8417 CALC BMI ABV UP PARAM F/U: HCPCS | Performed by: RADIOLOGY

## 2024-05-17 PROCEDURE — 3017F COLORECTAL CA SCREEN DOC REV: CPT | Performed by: RADIOLOGY

## 2024-05-17 PROCEDURE — 3077F SYST BP >= 140 MM HG: CPT | Performed by: RADIOLOGY

## 2024-05-17 NOTE — PROGRESS NOTES
Oncology Social Work    Date: 5/17/2024  Time: 11:13 AM  Name: Ck Truong  MRN: [id number]     Contact Type: Follow-up    Note:   Situation: This staff called Ck Truong via phone support to introduce myself as his Oncology Social Worker.     Background: Ck had his recent appointment at the [facility]. This staff was calling to review the Distress Thermometer he completed during this encounter.    Assessment: The contact number provided to this staff and Medical Records is his number and it was identified as such in the voicemail recording. Since the call went immediately to a voicemail, a message was left regarding the purpose of the call.   - A quick review of his Power of  document was provided since he doesn't have the documents on file.   - Education regarding the services was provided on the recorded message from the SW.  - No community referrals were placed at this time because there was no conversation indicating where Ck is in his treatment plan from his perspective. Referral services may be reconsidered should he express needs regarding assistance.     Recommendation: Follow-up will be initiated by Ck based on need.  provided him with my contact information and will remain available for support.        ALBERT Ramirez LSW, ZENA  Oncology Social Worker      Electronically signed by ALBERT Ramirez LSW, ZENA on 5/17/2024 at 11:13 AM

## 2024-05-17 NOTE — PROGRESS NOTES
pain.        Denies     Genitourinary:  Positive for difficulty urinating, frequency and urgency. Negative for dysuria.        Aua-19  Shimm-0  Stopped testosterone 6 weeks ago //poor stream improving post biopsy     Neurological:  Negative for weakness, numbness and headaches.     A complete review of systems was performed and found to be negative except as presented above.      Chaperone: No    Mediport: no    Pacemaker/ICD: no    Previous XRT: no        PHYSICAL EXAMINATION:   VITAL SIGNS: BP (!) 160/88 (Site: Left Upper Arm, Position: Sitting)   Pulse 90   Temp 97.2 °F (36.2 °C) (Infrared)   Resp 20   Ht 1.71 m (5' 7.32\")   Wt 128.6 kg (283 lb 8.2 oz)   SpO2 96%   BMI 43.98 kg/m²   ECOG PERFORMANCE STATUS: 0  PAIN RATIN/10  GENERAL: Pleasant well-developed adult male.  In no acute distress.  Alert and oriented ×3; clear mentation with appropriate affect  SKIN: Warm and dry, without jaundice, ecchymoses, or petechiae.  HEENT: Normocephalic, atraumatic.  PERRL/EOMI; ears, nose and lips unremarkable on external examination;  NECK:  No JVD; no palpable cervical adenopathy.  THORAX: No palpable supraclavicular or axillary adenopathy;  LUNGS: clear   CARDIAC: Regular rate and rhythm,  ABDOMEN: Soft, nontender, bowel sounds present  EXTREMITIES: No clubbing or cyanosis  NEUROLOGIC: No grossly apparent focal deficits. Cranial nerves grossly intact      Thank you for allowing my assistance in the care of Mr. Stover Alexi Smith PhD MD  Radiation Oncology     ATTESTATION: 60 minutes face-to-face time,  >50% spent in counseling/discussion/education.    CC: JEROME Gonzalez S. Cohen, SANJEEV Rios  ACC: Tumor Registry: [facility]      This document was created using a voice-recognition program.  Computer generated transcription errors may be present.

## 2024-05-30 ENCOUNTER — HOSPITAL ENCOUNTER (OUTPATIENT)
Dept: CT IMAGING | Age: 69
Discharge: HOME | End: 2024-05-30
Attending: RADIOLOGY

## 2024-05-30 DIAGNOSIS — Z00.6 ENCOUNTER FOR EXAMINATION FOR NORMAL COMPARISON OR CONTROL IN CLINICAL RESEARCH PROGRAM: ICD-10-CM

## 2024-05-31 ENCOUNTER — TELEPHONE (OUTPATIENT)
Dept: RADIATION ONCOLOGY | Age: 69
End: 2024-05-31

## 2024-05-31 ENCOUNTER — OFFICE VISIT (OUTPATIENT)
Dept: RADIATION ONCOLOGY | Age: 69
End: 2024-05-31

## 2024-05-31 VITALS
DIASTOLIC BLOOD PRESSURE: 82 MMHG | HEART RATE: 76 BPM | OXYGEN SATURATION: 98 % | SYSTOLIC BLOOD PRESSURE: 166 MMHG | TEMPERATURE: 97 F | RESPIRATION RATE: 20 BRPM | WEIGHT: 276 LBS | BODY MASS INDEX: 42.81 KG/M2

## 2024-05-31 DIAGNOSIS — C61 MALIGNANT NEOPLASM OF PROSTATE (HCC): Primary | ICD-10-CM

## 2024-05-31 PROCEDURE — 99024 POSTOP FOLLOW-UP VISIT: CPT | Performed by: RADIOLOGY

## 2024-06-01 RX ORDER — LORAZEPAM 0.5 MG/1
0.5 TABLET ORAL PRN
Qty: 2 TABLET | Refills: 0 | Status: SHIPPED | OUTPATIENT
Start: 2024-06-01 | End: 2024-06-02

## 2024-06-01 RX ORDER — OXYCODONE HYDROCHLORIDE AND ACETAMINOPHEN 5; 325 MG/1; MG/1
1 TABLET ORAL EVERY 6 HOURS PRN
Qty: 2 TABLET | Refills: 0 | Status: SHIPPED | OUTPATIENT
Start: 2024-06-01 | End: 2024-06-02

## 2024-06-01 RX ORDER — CIPROFLOXACIN 500 MG/1
500 TABLET, FILM COATED ORAL 2 TIMES DAILY
Qty: 6 TABLET | Refills: 0 | Status: SHIPPED | OUTPATIENT
Start: 2024-06-01 | End: 2024-06-04

## 2024-06-03 ENCOUNTER — TELEPHONE (OUTPATIENT)
Dept: CARDIOLOGY CLINIC | Age: 69
End: 2024-06-03

## 2024-06-03 NOTE — TELEPHONE ENCOUNTER
Agueda Hutchison from [facility] calling, pt is having Fiducial spacers placed for Prostate CA. Asking if he can hold ASA and Plavix x 5 days prior?    Agueda Hutchison call back number is [phone]

## 2024-06-05 ENCOUNTER — TELEPHONE (OUTPATIENT)
Dept: UROLOGY | Age: 69
End: 2024-06-05

## 2024-06-05 ENCOUNTER — OFFICE VISIT (OUTPATIENT)
Dept: UROLOGY | Age: 69
End: 2024-06-05
Payer: MEDICARE

## 2024-06-05 VITALS — WEIGHT: 282 LBS | RESPIRATION RATE: 20 BRPM | BODY MASS INDEX: 44.26 KG/M2 | HEIGHT: 67 IN

## 2024-06-05 DIAGNOSIS — C61 PROSTATE CANCER (HCC): Primary | ICD-10-CM

## 2024-06-05 DIAGNOSIS — R79.89 LOW TESTOSTERONE IN MALE: ICD-10-CM

## 2024-06-05 PROCEDURE — 99204 OFFICE O/P NEW MOD 45 MIN: CPT | Performed by: UROLOGY

## 2024-06-05 PROCEDURE — 3017F COLORECTAL CA SCREEN DOC REV: CPT | Performed by: UROLOGY

## 2024-06-05 PROCEDURE — 1036F TOBACCO NON-USER: CPT | Performed by: UROLOGY

## 2024-06-05 PROCEDURE — G8417 CALC BMI ABV UP PARAM F/U: HCPCS | Performed by: UROLOGY

## 2024-06-05 PROCEDURE — G8427 DOCREV CUR MEDS BY ELIG CLIN: HCPCS | Performed by: UROLOGY

## 2024-06-05 PROCEDURE — 1123F ACP DISCUSS/DSCN MKR DOCD: CPT | Performed by: UROLOGY

## 2024-06-05 NOTE — PROGRESS NOTES
Diabetes Paternal Grandmother      Outpatient Medications Marked as Taking for the 24 encounter (Office Visit) with Reed Edwards MD   Medication Sig Dispense Refill    metoprolol tartrate (LOPRESSOR) 25 MG tablet Take 0.5 tablets by mouth 2 times daily 180 tablet 3    Semaglutide (OZEMPIC, 2 MG/DOSE, SC) Inject into the skin      clopidogrel (PLAVIX) 75 MG tablet Take 1 tablet by mouth daily 90 tablet 3    nitroGLYCERIN (NITROSTAT) 0.4 MG SL tablet Place 1 tablet under the tongue every 5 minutes as needed for Chest pain up to max of 3 total doses. If no relief after 1 dose, call 911. 25 tablet 3    allopurinol (ZYLOPRIM) 300 MG tablet Take 1 tablet by mouth daily      tamsulosin (FLOMAX) 0.4 MG capsule Take 1 capsule by mouth 2 times daily      aspirin 81 MG chewable tablet Take 1 tablet by mouth daily 30 tablet 3       Lipitor [atorvastatin], Crestor [rosuvastatin calcium], and Pravastatin  Social History     Tobacco Use   Smoking Status Former    Current packs/day: 0.00    Types: Cigarettes    Quit date: 1980    Years since quittin.0   Smokeless Tobacco Never       Social History     Substance and Sexual Activity   Alcohol Use Yes    Alcohol/week: 24.0 standard drinks of alcohol    Types: 24 Cans of beer per week       REVIEW OF SYSTEMS:  Constitutional: negative  Eyes: negative  Respiratory: negative  Cardiovascular: negative  Gastrointestinal: negative  Musculoskeletal: negative  Genitourinary: negative except for what is in HPI  Skin: negative   Neurological: negative  Hematological/Lymphatic: negative  Psychological: negative    Physical Exam:    This a 69 y.o. male   Vitals:    24 1142   Resp: 20     Constitutional: Patient in no acute distress;   Neuro: alert and oriented to person place and time.    Psych: Mood and affect normal.  Skin: Normal  Lungs: Respiratory effort normal  Cardiovascular:  Normal peripheral pulses  Abdomen: Soft, non-tender, non-distended with no CVA, flank pain,

## 2024-06-06 ENCOUNTER — CLINICAL DOCUMENTATION (OUTPATIENT)
Dept: UROLOGY | Age: 69
End: 2024-06-06

## 2024-06-17 ENCOUNTER — HOSPITAL ENCOUNTER (OUTPATIENT)
Dept: RADIATION ONCOLOGY | Age: 69
Discharge: HOME | End: 2024-06-17
Payer: MEDICARE

## 2024-06-17 VITALS
WEIGHT: 283.4 LBS | HEART RATE: 79 BPM | RESPIRATION RATE: 18 BRPM | TEMPERATURE: 98.4 F | OXYGEN SATURATION: 97 % | DIASTOLIC BLOOD PRESSURE: 65 MMHG | BODY MASS INDEX: 43.97 KG/M2 | SYSTOLIC BLOOD PRESSURE: 135 MMHG

## 2024-06-17 PROCEDURE — A4648 IMPLANTABLE TISSUE MARKER: HCPCS | Performed by: RADIOLOGY

## 2024-06-17 PROCEDURE — 55874 TPRNL PLMT BIODEGRDABL MATRL: CPT

## 2024-06-17 PROCEDURE — C1889 IMPLANT/INSERT DEVICE, NOC: HCPCS

## 2024-06-17 PROCEDURE — 76942 ECHO GUIDE FOR BIOPSY: CPT

## 2024-06-17 PROCEDURE — 55876 PLACE RT DEVICE/MARKER PROS: CPT

## 2024-06-17 ASSESSMENT — PAIN SCALES - GENERAL: PAINLEVEL_OUTOF10: 2

## 2024-07-09 ENCOUNTER — OFFICE VISIT (OUTPATIENT)
Dept: RADIATION ONCOLOGY | Age: 69
End: 2024-07-09

## 2024-07-09 VITALS
OXYGEN SATURATION: 98 % | WEIGHT: 283.29 LBS | HEART RATE: 78 BPM | BODY MASS INDEX: 43.95 KG/M2 | TEMPERATURE: 97.8 F | SYSTOLIC BLOOD PRESSURE: 132 MMHG | DIASTOLIC BLOOD PRESSURE: 78 MMHG | RESPIRATION RATE: 20 BRPM

## 2024-07-09 DIAGNOSIS — C61 MALIGNANT NEOPLASM OF PROSTATE (HCC): Primary | ICD-10-CM

## 2024-07-09 NOTE — PROGRESS NOTES
nightly  Proctitis: None  Diarrhea: None  Comments: prone to constipation     Additional Comments: teaching done michelle anal care review wife present Appt with Dr Edwards 24 to evaluate if ADT needed review testing Decipher    MEDICATIONS:     Current Outpatient Medications   Medication Sig Dispense Refill    metoprolol tartrate (LOPRESSOR) 25 MG tablet Take 0.5 tablets by mouth 2 times daily 180 tablet 3    Semaglutide (OZEMPIC, 2 MG/DOSE, SC) Inject into the skin      clopidogrel (PLAVIX) 75 MG tablet Take 1 tablet by mouth daily 90 tablet 3    nitroGLYCERIN (NITROSTAT) 0.4 MG SL tablet Place 1 tablet under the tongue every 5 minutes as needed for Chest pain up to max of 3 total doses. If no relief after 1 dose, call 911. 25 tablet 3    allopurinol (ZYLOPRIM) 300 MG tablet Take 1 tablet by mouth daily      tamsulosin (FLOMAX) 0.4 MG capsule Take 1 capsule by mouth 2 times daily      aspirin 81 MG chewable tablet Take 1 tablet by mouth daily 30 tablet 3     No current facility-administered medications for this visit.     * New    PHYSICAL EXAM:       ECO - Asymptomatic (Fully active, able to carry on all pre-disease activities without restriction)     General: NAD, AO x 3, Mentation is clear with appropriate affect.  HEENT: Normocephalic, atraumatic  Thorax:  Unlabored  Abdomen:  Soft, NT/ND, no rebound  Neuro:  Cranial nerves grossly intact; no focal deficits    Chemotherapy Update: Concurrent ADT    Treatment Imaging: CBCT    ASSESSMENT: No significant radiation side effects.    New medications, diagnostic results: Not applicable    PLAN: Again reviewed potential side effects of radiation for the patient's treatment.    Continue local/topical care.    Continue current radiation course as prescribed.

## 2024-07-16 ENCOUNTER — OFFICE VISIT (OUTPATIENT)
Dept: RADIATION ONCOLOGY | Age: 69
End: 2024-07-16

## 2024-07-16 VITALS
HEART RATE: 76 BPM | RESPIRATION RATE: 20 BRPM | SYSTOLIC BLOOD PRESSURE: 136 MMHG | BODY MASS INDEX: 43.03 KG/M2 | OXYGEN SATURATION: 95 % | WEIGHT: 277.34 LBS | DIASTOLIC BLOOD PRESSURE: 80 MMHG | TEMPERATURE: 97.2 F

## 2024-07-16 DIAGNOSIS — C61 MALIGNANT NEOPLASM OF PROSTATE (HCC): Primary | ICD-10-CM

## 2024-07-16 NOTE — PROGRESS NOTES
[facility]          Radiation Oncology     [address] [address]  Phone: [phone] - Option 2  Fax:[phone]               Dr. Davin Vasquez MD MS        Dr. Laureen Smith PhD MD       On Treatment Visit Note (OTV)    Date of Service: 2024  Patient ID: Ck Truong   : 1955  MRN: [id number]   Acct Number:        RADIATION ONCOLOGY ATTENDING:  Laureen Smith PhD MD    DIAGNOSIS:   Cancer Staging   Malignant neoplasm of prostate (HCC)  Staging form: Prostate, AJCC 8th Edition  - Clinical stage from 2024: Stage IIIB (cT3a, cN0, cM0, PSA: 7.4, Grade Group: 2) - Signed by Laureen Smith MD on 2024  Staging comments: +EPE on MRI      Treatment Area: Pelvis- Male    Current Total Dose(cGy): 1500  Current Fraction:   Final/Cumulative Rx. Dose (cGy): 7000    Patient was seen today for weekly visit.     Wt Readings from Last 3 Encounters:   24 125.8 kg (277 lb 5.4 oz)   24 128.5 kg (283 lb 4.7 oz)   24 128.5 kg (283 lb 6.4 oz)       /80 (Site: Left Upper Arm, Position: Sitting)   Pulse 76   Temp 97.2 °F (36.2 °C) (Infrared)   Resp 20   Wt 125.8 kg (277 lb 5.4 oz)   SpO2 95%   BMI 43.03 kg/m²     Lab Results   Component Value Date    WBC 9.4 2018    HGB 13.7 (L) 2018    HCT 40.9 (L) 2018     2018       Comfort Alteration  Fatigue:None, able to perform daily activities    Pain Location:   Pain Intensity (Current): 0 No Pain  Pain Treatment: N/A  Pain Relief: n/a    Emotional Alteration:   Coping: effective    Nutritional Alteration  Anorexia: none   Nausea: No nausea noted  Vomiting: No vomiting     Skin Alteration   Skin reaction: No changes noted    Elimination Alterations  Urinary Frequency: Urinating less than once an hour- slight slower stream denies hesitency  Urinary Retention: Normal  Urinary

## 2024-07-17 ENCOUNTER — OFFICE VISIT (OUTPATIENT)
Dept: UROLOGY | Age: 69
End: 2024-07-17
Payer: MEDICARE

## 2024-07-17 VITALS — RESPIRATION RATE: 22 BRPM | WEIGHT: 275 LBS | HEIGHT: 67 IN | BODY MASS INDEX: 43.16 KG/M2

## 2024-07-17 DIAGNOSIS — C61 PROSTATE CANCER (HCC): Primary | ICD-10-CM

## 2024-07-17 DIAGNOSIS — R79.89 LOW TESTOSTERONE IN MALE: ICD-10-CM

## 2024-07-17 PROCEDURE — G8417 CALC BMI ABV UP PARAM F/U: HCPCS | Performed by: UROLOGY

## 2024-07-17 PROCEDURE — 3017F COLORECTAL CA SCREEN DOC REV: CPT | Performed by: UROLOGY

## 2024-07-17 PROCEDURE — G8427 DOCREV CUR MEDS BY ELIG CLIN: HCPCS | Performed by: UROLOGY

## 2024-07-17 PROCEDURE — 1036F TOBACCO NON-USER: CPT | Performed by: UROLOGY

## 2024-07-17 PROCEDURE — 99214 OFFICE O/P EST MOD 30 MIN: CPT | Performed by: UROLOGY

## 2024-07-17 PROCEDURE — 1123F ACP DISCUSS/DSCN MKR DOCD: CPT | Performed by: UROLOGY

## 2024-07-17 RX ORDER — TAMSULOSIN HYDROCHLORIDE 0.4 MG/1
0.4 CAPSULE ORAL DAILY
Qty: 90 CAPSULE | Refills: 3 | Status: SHIPPED | OUTPATIENT
Start: 2024-07-17

## 2024-07-17 NOTE — PROGRESS NOTES
Dr. Reed Edwards MD  [facility]  Urology Clinic  New Patient Visit      Patient:  Ck Truong  YOB: 1955  Date: 7/17/2024    HISTORY OF PRESENT ILLNESS:   The patient is a 69 y.o. male who presents today for evaluation of the following problem(s): prostate cancer [place] 7 3+4 with KIANA on the right side on MRI. PET negative. Scheduled for EBRT.  Decipher showed intermediate risk.     Overall the problem(s) : are stable.  Associated Symptoms: No dysuria, gross hematuria.  Pain Severity: 0/10    Summary of old records:   See above    Imaging/Labs reviewed during today's visit:  I have independently reviewed and verified the following films during today's visit.  PSA and T    Last several PSA's:  No results found for: \"PSA\"    Last total testosterone:  No results found for: \"TESTOSTERONE\"    Urinalysis today:  No results found for this visit on 07/17/24.      Last BUN and creatinine:  Lab Results   Component Value Date    BUN 13 05/22/2018     Lab Results   Component Value Date    CREATININE 0.9 05/22/2018       Imaging Reviewed during this Office Visit:   (results were independently reviewed by physician and radiology report verified)    PAST MEDICAL, FAMILY AND SOCIAL HISTORY:  Past Medical History:   Diagnosis Date    Arthritis     BPH (benign prostatic hyperplasia)     Hyperlipidemia     Hypertension     Other disorders of kidney and ureter in diseases classified elsewhere     low testosterone- gets shots [id number]     Past Surgical History:   Procedure Laterality Date    CAROTID STENT  05/2018    COLONOSCOPY  06/01/2008    normal    CORONARY STENT PLACEMENT  05/2018    JOINT REPLACEMENT Left 2015     Family History   Problem Relation Age of Onset    Cancer Mother     Thyroid Disease Mother     Cancer Father     Diabetes Father     Thyroid Disease Sister     Early Death Brother     Cancer Paternal Uncle     Alzheimer's Disease Maternal Grandmother     Early Death Maternal Grandfather     Heart

## 2024-07-23 ENCOUNTER — OFFICE VISIT (OUTPATIENT)
Dept: RADIATION ONCOLOGY | Age: 69
End: 2024-07-23

## 2024-07-23 VITALS
OXYGEN SATURATION: 98 % | BODY MASS INDEX: 42.57 KG/M2 | SYSTOLIC BLOOD PRESSURE: 144 MMHG | RESPIRATION RATE: 18 BRPM | HEART RATE: 78 BPM | WEIGHT: 271.83 LBS | TEMPERATURE: 97.5 F | DIASTOLIC BLOOD PRESSURE: 89 MMHG

## 2024-07-23 DIAGNOSIS — C61 MALIGNANT NEOPLASM OF PROSTATE (HCC): Primary | ICD-10-CM

## 2024-07-23 RX ORDER — LOPERAMIDE HYDROCHLORIDE 2 MG/1
2 CAPSULE ORAL 4 TIMES DAILY PRN
COMMUNITY

## 2024-07-23 NOTE — PROGRESS NOTES
CBCT    ASSESSMENT: Modest radiation side effects, GI related. Likely recently worsened by dietary consumption over the weekend.    New medications, diagnostic results: Recommend the following medication changes, Advised using ibuprofen 200-600mg every 6-8 hours to see if this will help with urinary flow. Advised starting imodium half a tablet nightly and take 1-3 imodium through the day as needed if there are persistent loose stools, but hopefully over the coming days this will more so normalize as he gets further out from large fruit intake over the weekend.  Otherwise, tolerating treatment and will continue course as planned.     PLAN: Again reviewed potential side effects of radiation for the patient's treatment.    Continue local/topical care.    Advised using ibuprofen 200-600mg every 6-8 hours to see if this will help with urinary flow.    Take 0.5 imodium tablet nightly for bowel prophylaxis. Advised to take additional doses throughout the day as needed, but bowels should likely gradually improve with routine use of imodium and as he gets further out from copious amount of fruit intake over the weekend   Drink plenty of fluids, especially over the coming days to counter act recent diarrhea. Consider drinking gatorade or other similar drink to help replenish electrolytes as well   Continue current radiation course as prescribed.     Electronically signed by QUYNH Smith on 7/16/2024 at 10:00 AM  Ke Mckeon PA-C

## 2024-07-26 RX ORDER — RELUGOLIX 120 MG/1
120 TABLET, FILM COATED ORAL DAILY
Qty: 30 TABLET | Refills: 0 | Status: SHIPPED | COMMUNITY
Start: 2024-07-26

## 2024-07-30 ENCOUNTER — OFFICE VISIT (OUTPATIENT)
Dept: RADIATION ONCOLOGY | Age: 69
End: 2024-07-30

## 2024-07-30 VITALS — WEIGHT: 271.39 LBS | BODY MASS INDEX: 42.51 KG/M2

## 2024-07-30 DIAGNOSIS — C61 MALIGNANT NEOPLASM OF PROSTATE (HCC): Primary | ICD-10-CM

## 2024-07-30 NOTE — PROGRESS NOTES
stream, otherwise doing well  Nocturia: 4-6 times nightly- Reports usually 4-5x per night, was ~2 per night before starting treatment  Proctitis: None-using michelle anal care/Aquaphor  Diarrhea: Abdominal cramping with <2 soft or liquid BM daily-. Using 1/2 tablet of imodium at bedtime. He has found that imodium seems to help with the loose stools. He is prone to constipation so he has been using sparingly and monitoring consistency of stool closely.  Comments: usually prone to constipation prior to starting radiation    Additional Comments: Due to GI issues pt put ozempic on hold - may introduce slowly   MEDICATIONS:     Current Outpatient Medications   Medication Sig Dispense Refill    Relugolix (ORGOVYX) 120 MG TABS Take 120 mg by mouth daily 30 tablet 0    loperamide (IMODIUM) 2 MG capsule Take 1 capsule by mouth 4 times daily as needed for Diarrhea As needed      tamsulosin (FLOMAX) 0.4 MG capsule Take 1 capsule by mouth 2 times daily      tamsulosin (FLOMAX) 0.4 MG capsule Take 1 capsule by mouth daily (Patient not taking: Reported on 2024) 90 capsule 3    metoprolol tartrate (LOPRESSOR) 25 MG tablet Take 0.5 tablets by mouth 2 times daily 180 tablet 3    Semaglutide (OZEMPIC, 2 MG/DOSE, SC) Inject into the skin      clopidogrel (PLAVIX) 75 MG tablet Take 1 tablet by mouth daily 90 tablet 3    nitroGLYCERIN (NITROSTAT) 0.4 MG SL tablet Place 1 tablet under the tongue every 5 minutes as needed for Chest pain up to max of 3 total doses. If no relief after 1 dose, call 911. 25 tablet 3    allopurinol (ZYLOPRIM) 300 MG tablet Take 1 tablet by mouth daily      aspirin 81 MG chewable tablet Take 1 tablet by mouth daily 30 tablet 3     No current facility-administered medications for this visit.     * New    PHYSICAL EXAM:       ECO - Asymptomatic (Fully active, able to carry on all pre-disease activities without restriction)     General: NAD, AO x 3, Mentation is clear with appropriate affect.  HEENT:

## 2024-08-05 ENCOUNTER — TELEPHONE (OUTPATIENT)
Dept: UROLOGY | Age: 69
End: 2024-08-05

## 2024-08-05 NOTE — TELEPHONE ENCOUNTER
Patient wife would like to know if the orgovyx can cause dizziness or drop his BP. /60 and typically does not run that low. He feels off. Next appointment 10/30/2024

## 2024-08-06 ENCOUNTER — OFFICE VISIT (OUTPATIENT)
Dept: RADIATION ONCOLOGY | Age: 69
End: 2024-08-06

## 2024-08-06 VITALS
TEMPERATURE: 97.9 F | HEART RATE: 84 BPM | OXYGEN SATURATION: 97 % | DIASTOLIC BLOOD PRESSURE: 72 MMHG | SYSTOLIC BLOOD PRESSURE: 134 MMHG | BODY MASS INDEX: 41.92 KG/M2 | WEIGHT: 267.64 LBS

## 2024-08-06 DIAGNOSIS — C61 MALIGNANT NEOPLASM OF PROSTATE (HCC): Primary | ICD-10-CM

## 2024-08-06 NOTE — PROGRESS NOTES
initially, but able to empty his bladder well he believes.   Urinary Incontinence: None  Dysuria:  Subtle dysuria at beginning of stream, otherwise doing well  Nocturia: 4-6 times nightly- Reports usually 4-5x per night, was ~2 per night before starting treatment  Proctitis: None-using michelle anal care/Aquaphor  Diarrhea: Abdominal cramping with <2 soft or liquid BM daily.  Comments: usually prone to constipation prior to starting radiation    Additional Comments: Complains of new low back pain this week 3/10 pain. Back spasms. He was feeling a bit dizzy over the weekend. His wife (RN) has been monitoring his BP and reportedly was consistently soft over the weekend, around 100/60. He is typically around 120-130 systolic. He contacted urology who advised to stop the orgovyx for now. He reports that was two days ago and has been feeling a bit better today. He is on metoprolol, but this dose had to be cut in half and other previous HTN medications had to be D/C due to weight loss from ozempic.  He has follow up with urology 9/4/2024 to discuss options for hormonal treatment.     MEDICATIONS:     Current Outpatient Medications   Medication Sig Dispense Refill    Relugolix (ORGOVYX) 120 MG TABS Take 120 mg by mouth daily 30 tablet 0    loperamide (IMODIUM) 2 MG capsule Take 1 capsule by mouth 4 times daily as needed for Diarrhea As needed      tamsulosin (FLOMAX) 0.4 MG capsule Take 1 capsule by mouth daily (Patient not taking: Reported on 7/30/2024) 90 capsule 3    metoprolol tartrate (LOPRESSOR) 25 MG tablet Take 0.5 tablets by mouth 2 times daily 180 tablet 3    Semaglutide (OZEMPIC, 2 MG/DOSE, SC) Inject into the skin      clopidogrel (PLAVIX) 75 MG tablet Take 1 tablet by mouth daily 90 tablet 3    nitroGLYCERIN (NITROSTAT) 0.4 MG SL tablet Place 1 tablet under the tongue every 5 minutes as needed for Chest pain up to max of 3 total doses. If no relief after 1 dose, call 911. 25 tablet 3    allopurinol (ZYLOPRIM) 300

## 2024-08-13 ENCOUNTER — OFFICE VISIT (OUTPATIENT)
Dept: RADIATION ONCOLOGY | Age: 69
End: 2024-08-13

## 2024-08-13 VITALS
SYSTOLIC BLOOD PRESSURE: 124 MMHG | BODY MASS INDEX: 41.81 KG/M2 | RESPIRATION RATE: 20 BRPM | WEIGHT: 266.98 LBS | DIASTOLIC BLOOD PRESSURE: 78 MMHG | TEMPERATURE: 97.5 F | HEART RATE: 78 BPM | OXYGEN SATURATION: 97 %

## 2024-08-13 DIAGNOSIS — C61 MALIGNANT NEOPLASM OF PROSTATE (HCC): Primary | ICD-10-CM

## 2024-08-13 NOTE — PROGRESS NOTES
[facility]          Radiation Oncology     [address] [address]  Phone: [phone] - Option 2  Fax:[phone]               Dr. Davin Vasquez MD MS        Dr. Laureen Smith PhD MD       On Treatment Visit Note (OTV)    Date of Service: 2024  Patient ID: Ck Truong   : 1955  MRN: [id number]   Acct Number:        RADIATION ONCOLOGY ATTENDING:  Laureen Smith PhD MD    DIAGNOSIS:   Cancer Staging   Malignant neoplasm of prostate (HCC)  Staging form: Prostate, AJCC 8th Edition  - Clinical stage from 2024: Stage IIIB (cT3a, cN0, cM0, PSA: 7.4, Grade Group: 2) - Signed by Laureen Smith MD on 2024  Staging comments: +EPE on MRI      Treatment Area: Pelvis- Male    Current Total Dose(cGy): 6560  Current Fraction:   Final/Cumulative Rx. Dose (cGy): 7000    Patient was seen today for weekly visit.     Wt Readings from Last 3 Encounters:   24 121.1 kg (266 lb 15.6 oz)   24 121.4 kg (267 lb 10.2 oz)   24 123.1 kg (271 lb 6.2 oz)       /78 (Site: Left Upper Arm, Position: Sitting)   Pulse 78   Temp 97.5 °F (36.4 °C) (Infrared)   Resp 20   Wt 121.1 kg (266 lb 15.6 oz)   SpO2 97%   BMI 41.81 kg/m²     Lab Results   Component Value Date    WBC 9.4 2018    HGB 13.7 (L) 2018    HCT 40.9 (L) 2018     2018       Comfort Alteration  Fatigue:Able to perform daily activities with rest periods    Pain Location: Lower Back  Pain Intensity (Current): 1 Between no and mild pain  Pain Treatment: N/A  Pain Relief: n/a    Emotional Alteration:   Coping: effective     Nutritional Alteration  Anorexia: none   Nausea: No nausea noted  Vomiting: No vomiting     Skin Alteration   Skin reaction: No changes noted    Elimination Alterations  Urinary Frequency: None-  Urinary Retention:  Mild hesitancy initially, but able to empty his

## 2024-08-15 ENCOUNTER — CLINICAL DOCUMENTATION (OUTPATIENT)
Dept: RADIATION ONCOLOGY | Age: 69
End: 2024-08-15

## 2024-09-04 ENCOUNTER — OFFICE VISIT (OUTPATIENT)
Dept: UROLOGY | Age: 69
End: 2024-09-04
Payer: MEDICARE

## 2024-09-04 VITALS
HEIGHT: 67 IN | WEIGHT: 268 LBS | DIASTOLIC BLOOD PRESSURE: 76 MMHG | BODY MASS INDEX: 42.06 KG/M2 | RESPIRATION RATE: 16 BRPM | SYSTOLIC BLOOD PRESSURE: 116 MMHG

## 2024-09-04 DIAGNOSIS — C61 PROSTATE CANCER (HCC): Primary | ICD-10-CM

## 2024-09-04 DIAGNOSIS — R79.89 LOW TESTOSTERONE IN MALE: ICD-10-CM

## 2024-09-04 PROCEDURE — G8417 CALC BMI ABV UP PARAM F/U: HCPCS | Performed by: UROLOGY

## 2024-09-04 PROCEDURE — 3074F SYST BP LT 130 MM HG: CPT | Performed by: UROLOGY

## 2024-09-04 PROCEDURE — 3017F COLORECTAL CA SCREEN DOC REV: CPT | Performed by: UROLOGY

## 2024-09-04 PROCEDURE — 1036F TOBACCO NON-USER: CPT | Performed by: UROLOGY

## 2024-09-04 PROCEDURE — G8428 CUR MEDS NOT DOCUMENT: HCPCS | Performed by: UROLOGY

## 2024-09-04 PROCEDURE — 99214 OFFICE O/P EST MOD 30 MIN: CPT | Performed by: UROLOGY

## 2024-09-04 PROCEDURE — 1123F ACP DISCUSS/DSCN MKR DOCD: CPT | Performed by: UROLOGY

## 2024-09-04 PROCEDURE — 3078F DIAST BP <80 MM HG: CPT | Performed by: UROLOGY

## 2024-09-04 NOTE — PROGRESS NOTES
Dr. Reed Edwards MD  [facility]  Urology Clinic  New Patient Visit      Patient:  Ck Truong  YOB: 1955  Date: 9/4/2024    HISTORY OF PRESENT ILLNESS:   The patient is a 69 y.o. male who presents today for evaluation of the following problem(s): prostate cancer [place] 7 3+4 with KIANA on the right side on MRI. PET negative. Scheduled for EBRT.  Decipher showed intermediate risk. He finsished EBRT but had bad SEs from orgovyx. He is not interested in trying other options which is reasonable.     Overall the problem(s) : are stable.  Associated Symptoms: No dysuria, gross hematuria.  Pain Severity: 0/10    Summary of old records:   See above    Imaging/Labs reviewed during today's visit:  I have independently reviewed and verified the following films during today's visit.  PSA and T    Last several PSA's:  No results found for: \"PSA\"    Last total testosterone:  No results found for: \"TESTOSTERONE\"    Urinalysis today:  No results found for this visit on 09/04/24.      Last BUN and creatinine:  Lab Results   Component Value Date    BUN 13 05/22/2018     Lab Results   Component Value Date    CREATININE 0.9 05/22/2018       Imaging Reviewed during this Office Visit:   (results were independently reviewed by physician and radiology report verified)    PAST MEDICAL, FAMILY AND SOCIAL HISTORY:  Past Medical History:   Diagnosis Date    Arthritis     BPH (benign prostatic hyperplasia)     Hyperlipidemia     Hypertension     Other disorders of kidney and ureter in diseases classified elsewhere     low testosterone- gets shots [id number]     Past Surgical History:   Procedure Laterality Date    CAROTID STENT  05/2018    COLONOSCOPY  06/01/2008    normal    CORONARY STENT PLACEMENT  05/2018    JOINT REPLACEMENT Left 2015     Family History   Problem Relation Age of Onset    Cancer Mother     Thyroid Disease Mother     Cancer Father     Diabetes Father     Thyroid Disease Sister     Early Death Brother

## 2024-09-30 ENCOUNTER — HOSPITAL ENCOUNTER (OUTPATIENT)
Age: 69
Discharge: HOME | End: 2024-09-30
Payer: MEDICARE

## 2024-09-30 ENCOUNTER — OFFICE VISIT (OUTPATIENT)
Dept: RADIATION ONCOLOGY | Age: 69
End: 2024-09-30

## 2024-09-30 VITALS
TEMPERATURE: 97.5 F | RESPIRATION RATE: 20 BRPM | WEIGHT: 268.3 LBS | DIASTOLIC BLOOD PRESSURE: 84 MMHG | OXYGEN SATURATION: 98 % | BODY MASS INDEX: 42.01 KG/M2 | SYSTOLIC BLOOD PRESSURE: 130 MMHG | HEART RATE: 85 BPM

## 2024-09-30 DIAGNOSIS — C61 MALIGNANT NEOPLASM OF PROSTATE (HCC): Primary | ICD-10-CM

## 2024-09-30 DIAGNOSIS — C61 PROSTATE CANCER (HCC): ICD-10-CM

## 2024-09-30 LAB — PSA SERPL-MCNC: 0.04 NG/ML (ref 0–1)

## 2024-09-30 PROCEDURE — 84153 ASSAY OF PSA TOTAL: CPT

## 2024-09-30 PROCEDURE — 99024 POSTOP FOLLOW-UP VISIT: CPT | Performed by: PHYSICIAN ASSISTANT

## 2024-09-30 ASSESSMENT — ENCOUNTER SYMPTOMS
RECTAL PAIN: 0
COUGH: 0
DIARRHEA: 0
BLOOD IN STOOL: 0
CONSTIPATION: 1
SHORTNESS OF BREATH: 0
ABDOMINAL PAIN: 0

## 2024-10-08 ENCOUNTER — TELEPHONE (OUTPATIENT)
Dept: RADIATION ONCOLOGY | Age: 69
End: 2024-10-08

## 2024-10-08 NOTE — TELEPHONE ENCOUNTER
I attempted to contact Ck to review his PSA results. There was no answer and I left a VM requesting a call back at his convenience. If he calls back and I am unavailable, please inform him that his PSA looks excellent at 0.04! This is down from 5.74 before treatment! I would like for him to to get the repeat PSA in about 6 months, before his follow up with Dr Edwards. This was already previously ordered by Dr Edwards. I will plan to see him later in March as scheduled to touch base and review the labs. He should let us know if he needs anything else in the meantime!

## 2024-10-08 NOTE — TELEPHONE ENCOUNTER
Ck called yesterday - [facility]   Nostril Rim Text: The closure involved the nostril rim.

## 2024-12-11 RX ORDER — TAMSULOSIN HYDROCHLORIDE 0.4 MG/1
0.4 CAPSULE ORAL DAILY
Qty: 90 CAPSULE | Refills: 3 | Status: SHIPPED | OUTPATIENT
Start: 2024-12-11

## 2024-12-11 NOTE — TELEPHONE ENCOUNTER
Ck Truong called requesting a refill on the following medications:  Requested Prescriptions     Pending Prescriptions Disp Refills    tamsulosin (FLOMAX) 0.4 MG capsule 90 capsule 3     Sig: Take 1 capsule by mouth daily     Pharmacy verified:    [facility] [id number] - [address] Beena STRINGER [phone] - F [phone]     Date of last visit: 09/04/2024  Date of next visit (if applicable): 4/2/2025

## 2025-01-28 ENCOUNTER — OFFICE VISIT (OUTPATIENT)
Dept: CARDIOLOGY CLINIC | Age: 70
End: 2025-01-28
Payer: MEDICARE

## 2025-01-28 VITALS
DIASTOLIC BLOOD PRESSURE: 102 MMHG | HEIGHT: 67 IN | BODY MASS INDEX: 43.79 KG/M2 | WEIGHT: 279 LBS | HEART RATE: 86 BPM | SYSTOLIC BLOOD PRESSURE: 182 MMHG

## 2025-01-28 DIAGNOSIS — E78.01 FAMILIAL HYPERCHOLESTEROLEMIA: ICD-10-CM

## 2025-01-28 DIAGNOSIS — I42.0 DILATED CARDIOMYOPATHY (HCC): ICD-10-CM

## 2025-01-28 DIAGNOSIS — I25.10 CORONARY ARTERY DISEASE INVOLVING NATIVE CORONARY ARTERY OF NATIVE HEART WITHOUT ANGINA PECTORIS: ICD-10-CM

## 2025-01-28 DIAGNOSIS — I10 PRIMARY HYPERTENSION: Primary | ICD-10-CM

## 2025-01-28 PROCEDURE — G8417 CALC BMI ABV UP PARAM F/U: HCPCS | Performed by: NUCLEAR MEDICINE

## 2025-01-28 PROCEDURE — 3017F COLORECTAL CA SCREEN DOC REV: CPT | Performed by: NUCLEAR MEDICINE

## 2025-01-28 PROCEDURE — G8427 DOCREV CUR MEDS BY ELIG CLIN: HCPCS | Performed by: NUCLEAR MEDICINE

## 2025-01-28 PROCEDURE — 1159F MED LIST DOCD IN RCRD: CPT | Performed by: NUCLEAR MEDICINE

## 2025-01-28 PROCEDURE — 3077F SYST BP >= 140 MM HG: CPT | Performed by: NUCLEAR MEDICINE

## 2025-01-28 PROCEDURE — 99214 OFFICE O/P EST MOD 30 MIN: CPT | Performed by: NUCLEAR MEDICINE

## 2025-01-28 PROCEDURE — 1036F TOBACCO NON-USER: CPT | Performed by: NUCLEAR MEDICINE

## 2025-01-28 PROCEDURE — 1123F ACP DISCUSS/DSCN MKR DOCD: CPT | Performed by: NUCLEAR MEDICINE

## 2025-01-28 PROCEDURE — 3080F DIAST BP >= 90 MM HG: CPT | Performed by: NUCLEAR MEDICINE

## 2025-01-28 RX ORDER — METOPROLOL SUCCINATE 50 MG/1
50 TABLET, EXTENDED RELEASE ORAL DAILY
Qty: 90 TABLET | Refills: 3 | Status: SHIPPED | OUTPATIENT
Start: 2025-01-28

## 2025-01-28 RX ORDER — METOPROLOL SUCCINATE 50 MG/1
50 TABLET, EXTENDED RELEASE ORAL DAILY
COMMUNITY
End: 2025-01-28 | Stop reason: SDUPTHER

## 2025-01-28 NOTE — PROGRESS NOTES
Patient here for follow up.  Patient didn't bring medication list or bottles to appointment today.

## 2025-01-28 NOTE — PROGRESS NOTES
[facility]  [facility] CARDIOLOGY  [address] [place] [address]  Dept: [phone]  Dept Fax: [phone]  Loc: [phone]    Visit Date: 2025    Ckkathy Truong is a 70 y.o. male who presents todayfor:  Chief Complaint   Patient presents with    Follow-up    Hypertension    Hyperlipidemia    Cardiomyopathy    Coronary Artery Disease   Known CAD and stents  Also known CMP   Last echo Ef 40%  Issues with meds  Looks a very high BP  He claims better numbers at home  No dizziness  No syncope  Had issues with the statins   Didn't want to do them anymore  No chest pain   No changes in breathing  Some fatigue   Some tiredness  Lost 50 lbs        HPI:  HPI  Past Medical History:   Diagnosis Date    Arthritis     BPH (benign prostatic hyperplasia)     Hyperlipidemia     Hypertension     Other disorders of kidney and ureter in diseases classified elsewhere     low testosterone- gets shots [id number]      Past Surgical History:   Procedure Laterality Date    CAROTID STENT  2018    COLONOSCOPY  2008    normal    CORONARY STENT PLACEMENT  2018    JOINT REPLACEMENT Left 2015     Family History   Problem Relation Age of Onset    Cancer Mother     Thyroid Disease Mother     Cancer Father     Diabetes Father     Thyroid Disease Sister     Early Death Brother     Cancer Paternal Uncle     Alzheimer's Disease Maternal Grandmother     Early Death Maternal Grandfather     Heart Attack Maternal Grandfather     Diabetes Paternal Grandmother      Social History     Tobacco Use    Smoking status: Former     Current packs/day: 0.00     Types: Cigarettes     Quit date: 1980     Years since quittin.7    Smokeless tobacco: Never   Substance Use Topics    Alcohol use: Yes     Alcohol/week: 24.0 standard drinks of alcohol     Types: 24 Cans of beer per week      Current Outpatient Medications   Medication Sig Dispense Refill    tamsulosin (FLOMAX) 0.4 MG capsule Take 1

## 2025-02-05 DIAGNOSIS — I10 PRIMARY HYPERTENSION: ICD-10-CM

## 2025-02-05 DIAGNOSIS — I25.10 CORONARY ARTERY DISEASE INVOLVING NATIVE CORONARY ARTERY OF NATIVE HEART WITHOUT ANGINA PECTORIS: ICD-10-CM

## 2025-02-05 DIAGNOSIS — I42.0 DILATED CARDIOMYOPATHY (HCC): ICD-10-CM

## 2025-02-05 DIAGNOSIS — E78.01 FAMILIAL HYPERCHOLESTEROLEMIA: ICD-10-CM

## 2025-02-06 ENCOUNTER — TELEPHONE (OUTPATIENT)
Dept: CARDIOLOGY CLINIC | Age: 70
End: 2025-02-06

## 2025-02-06 NOTE — TELEPHONE ENCOUNTER
Patient had ECHO done at .  Anything needed?    Plan:  No follow-ups on file.  Change to toprol 50 mg daily   Echo to assess LV function   Consider adding ACE or ARB   Discussed statins again   Discussed Repatha, he decided not to do it   Continue risk factor modification and medical management  Thank you for allowing me to participate in the care of your patient. Please don't hesitate to contact me regarding any further issues related to the patient care     Orders Placed:  No orders of the defined types were placed in this encounter.

## 2025-02-20 RX ORDER — CLOPIDOGREL BISULFATE 75 MG/1
75 TABLET ORAL DAILY
Qty: 90 TABLET | Refills: 3 | Status: SHIPPED | OUTPATIENT
Start: 2025-02-20

## 2025-02-20 NOTE — TELEPHONE ENCOUNTER
Ck Truong called requesting a refill on the following medications:  Requested Prescriptions     Pending Prescriptions Disp Refills    clopidogrel (PLAVIX) 75 MG tablet 90 tablet 3     Sig: Take 1 tablet by mouth daily     Pharmacy verified: [facility]  .      Date of last visit: 1/28/2025  Date of next visit (if applicable): 2/03/2026

## 2025-03-17 DIAGNOSIS — C61 MALIGNANT NEOPLASM OF PROSTATE (HCC): Primary | ICD-10-CM

## 2025-03-24 ENCOUNTER — RESULTS FOLLOW-UP (OUTPATIENT)
Dept: UROLOGY | Age: 70
End: 2025-03-24

## 2025-03-26 ENCOUNTER — OFFICE VISIT (OUTPATIENT)
Dept: RADIATION ONCOLOGY | Age: 70
End: 2025-03-26
Payer: MEDICARE

## 2025-03-26 VITALS
RESPIRATION RATE: 20 BRPM | WEIGHT: 283.2 LBS | HEART RATE: 80 BPM | DIASTOLIC BLOOD PRESSURE: 80 MMHG | BODY MASS INDEX: 44.36 KG/M2 | TEMPERATURE: 97.7 F | SYSTOLIC BLOOD PRESSURE: 155 MMHG | OXYGEN SATURATION: 98 %

## 2025-03-26 DIAGNOSIS — C61 MALIGNANT NEOPLASM OF PROSTATE (HCC): Primary | ICD-10-CM

## 2025-03-26 PROCEDURE — 1123F ACP DISCUSS/DSCN MKR DOCD: CPT | Performed by: PHYSICIAN ASSISTANT

## 2025-03-26 PROCEDURE — 1126F AMNT PAIN NOTED NONE PRSNT: CPT | Performed by: PHYSICIAN ASSISTANT

## 2025-03-26 PROCEDURE — G8427 DOCREV CUR MEDS BY ELIG CLIN: HCPCS | Performed by: PHYSICIAN ASSISTANT

## 2025-03-26 PROCEDURE — 99214 OFFICE O/P EST MOD 30 MIN: CPT | Performed by: PHYSICIAN ASSISTANT

## 2025-03-26 PROCEDURE — 3077F SYST BP >= 140 MM HG: CPT | Performed by: PHYSICIAN ASSISTANT

## 2025-03-26 PROCEDURE — 1036F TOBACCO NON-USER: CPT | Performed by: PHYSICIAN ASSISTANT

## 2025-03-26 PROCEDURE — 3079F DIAST BP 80-89 MM HG: CPT | Performed by: PHYSICIAN ASSISTANT

## 2025-03-26 PROCEDURE — 3017F COLORECTAL CA SCREEN DOC REV: CPT | Performed by: PHYSICIAN ASSISTANT

## 2025-03-26 PROCEDURE — 1159F MED LIST DOCD IN RCRD: CPT | Performed by: PHYSICIAN ASSISTANT

## 2025-03-26 PROCEDURE — G8417 CALC BMI ABV UP PARAM F/U: HCPCS | Performed by: PHYSICIAN ASSISTANT

## 2025-03-26 ASSESSMENT — ENCOUNTER SYMPTOMS
RECTAL PAIN: 0
SHORTNESS OF BREATH: 0
COUGH: 0
CONSTIPATION: 0
ABDOMINAL PAIN: 0
BLOOD IN STOOL: 1
DIARRHEA: 0

## 2025-03-26 NOTE — PROGRESS NOTES
[facility]          Radiation Oncology     [address] [address]  Phone: [phone] - Option 2  Fax:[phone]               FOLLOW UP    Date of Service: 3/26/2025  Patient ID: Ck Truong   : 1955  MRN: [id number]   Acct Number:   CSN NO: [id number]      LOCATION: Radiation Oncology  PROVIDER: Ke Mckeon PA-C    DATE OF SERVICE: 3/26/2025      FOLLOW UP PHYSICIANS: Chadwick Edwards (urology) and  SILVIA Johnson (PCP)       DIAGNOSIS: C61 -- Malignant neoplasm of the prostate; Adenocarcinoma; [place] Score 3+4=7, Grade Group 2; PSA 7.38; Clinical stage IIB, Radiographic T3a N0 M0, Stage IIIB  Date of diagnosis: 2024      ASSESSMENT:  Ck Truong is doing well overall with some lingering post-radiation complaints/concerns. Mostly some urinary frequency/urgency but reports this seems to be improving. He reports he has some blood with first ejaculation in \"a couple years\" recently. He has mild hematuria for a couple days after this as well, but reports this has completely resolved since. He did not have pain with ejaculation or dysuria afterward. He will see if this recurs and discuss next steps with urology if it does.   He has noticed some blood with wiping intermittently and notices this more after a night of consuming alcohol reportedly. He reports he had this before radiation intermittently and attributed it to previous hemorrhoids. We discussed possibility of radiation effects on the anterior rectal wall as well, but given his history it is likely related to hemorrhoids. He reports he is due for colonoscopy and planned to proceed if he was cleared to proceed from radiation oncology standpoint. I agreed with proceeding with colonoscopy, he will contact his GI physician (Dr Plunkett)  PSA has increased since last check in 2024. He was still intermittently using/coming off of Orgovyx

## 2025-04-02 ENCOUNTER — OFFICE VISIT (OUTPATIENT)
Dept: UROLOGY | Age: 70
End: 2025-04-02
Payer: MEDICARE

## 2025-04-02 VITALS — WEIGHT: 280 LBS | RESPIRATION RATE: 20 BRPM | BODY MASS INDEX: 43.95 KG/M2 | HEIGHT: 67 IN

## 2025-04-02 DIAGNOSIS — C61 PROSTATE CANCER (HCC): Primary | ICD-10-CM

## 2025-04-02 DIAGNOSIS — N40.1 BENIGN PROSTATIC HYPERPLASIA WITH INCOMPLETE BLADDER EMPTYING: ICD-10-CM

## 2025-04-02 DIAGNOSIS — R79.89 LOW TESTOSTERONE IN MALE: ICD-10-CM

## 2025-04-02 DIAGNOSIS — R39.14 BENIGN PROSTATIC HYPERPLASIA WITH INCOMPLETE BLADDER EMPTYING: ICD-10-CM

## 2025-04-02 PROCEDURE — 3017F COLORECTAL CA SCREEN DOC REV: CPT | Performed by: UROLOGY

## 2025-04-02 PROCEDURE — G8427 DOCREV CUR MEDS BY ELIG CLIN: HCPCS | Performed by: UROLOGY

## 2025-04-02 PROCEDURE — 1159F MED LIST DOCD IN RCRD: CPT | Performed by: UROLOGY

## 2025-04-02 PROCEDURE — G8417 CALC BMI ABV UP PARAM F/U: HCPCS | Performed by: UROLOGY

## 2025-04-02 PROCEDURE — 99214 OFFICE O/P EST MOD 30 MIN: CPT | Performed by: UROLOGY

## 2025-04-02 PROCEDURE — 1123F ACP DISCUSS/DSCN MKR DOCD: CPT | Performed by: UROLOGY

## 2025-04-02 PROCEDURE — 1036F TOBACCO NON-USER: CPT | Performed by: UROLOGY

## 2025-04-02 RX ORDER — TADALAFIL 20 MG/1
20 TABLET ORAL DAILY PRN
Qty: 30 TABLET | Refills: 2 | Status: SHIPPED | OUTPATIENT
Start: 2025-04-02

## 2025-04-02 NOTE — PROGRESS NOTES
Dr. Reed Edwards MD  [facility]  Urology Clinic  New Patient Visit      Patient:  Ck Truong  YOB: 1955  Date: 4/2/2025    HISTORY OF PRESENT ILLNESS:   The patient is a 70 y.o. male who presents today for evaluation of the following problem(s): prostate cancer [place] 7 3+4 with KIANA on the right side on MRI. PET negative. Scheduled for EBRT.  Decipher showed intermediate risk. He finsished EBRT but had bad SEs from orgovyx. He is not interested in trying other options which is reasonable.     Overall the problem(s) : are stable.  Associated Symptoms: No dysuria, gross hematuria.  Pain Severity: 0/10    Summary of old records:   See above    Imaging/Labs reviewed during today's visit:  I have independently reviewed and verified the following films during today's visit.  PSA and T    Last several PSA's:  Lab Results   Component Value Date    PSA 0.14 03/20/2025    PSA 0.04 09/30/2024       Last total testosterone:  Lab Results   Component Value Date    TESTOSTERONE 97 03/20/2025       Urinalysis today:  No results found for this visit on 04/02/25.      Last BUN and creatinine:  Lab Results   Component Value Date    BUN 13 05/22/2018     Lab Results   Component Value Date    CREATININE 0.9 05/22/2018       Imaging Reviewed during this Office Visit:   (results were independently reviewed by physician and radiology report verified)    PAST MEDICAL, FAMILY AND SOCIAL HISTORY:  Past Medical History:   Diagnosis Date    Arthritis     BPH (benign prostatic hyperplasia)     Hyperlipidemia     Hypertension     Other disorders of kidney and ureter in diseases classified elsewhere     low testosterone- gets shots [id number]     Past Surgical History:   Procedure Laterality Date    CAROTID STENT  05/2018    COLONOSCOPY  06/01/2008    normal    CORONARY STENT PLACEMENT  05/2018    JOINT REPLACEMENT Left 2015     Family History   Problem Relation Age of Onset    Cancer Mother     Thyroid Disease Mother